# Patient Record
Sex: FEMALE | Race: WHITE | Employment: OTHER | URBAN - METROPOLITAN AREA
[De-identification: names, ages, dates, MRNs, and addresses within clinical notes are randomized per-mention and may not be internally consistent; named-entity substitution may affect disease eponyms.]

---

## 2017-04-07 ENCOUNTER — OFFICE VISIT (OUTPATIENT)
Dept: SURGERY | Age: 72
End: 2017-04-07

## 2017-04-07 VITALS
SYSTOLIC BLOOD PRESSURE: 121 MMHG | WEIGHT: 161 LBS | HEIGHT: 65 IN | HEART RATE: 77 BPM | DIASTOLIC BLOOD PRESSURE: 75 MMHG | BODY MASS INDEX: 26.82 KG/M2

## 2017-04-07 DIAGNOSIS — R92.8 ABNORMAL MAMMOGRAM: Primary | ICD-10-CM

## 2017-04-07 DIAGNOSIS — N63.0 LUMP OR MASS IN BREAST: ICD-10-CM

## 2017-04-07 DIAGNOSIS — I10 ESSENTIAL HYPERTENSION, BENIGN: ICD-10-CM

## 2017-04-07 DIAGNOSIS — C50.012 MALIGNANT NEOPLASM OF NIPPLE OF LEFT BREAST IN FEMALE (HCC): ICD-10-CM

## 2017-04-07 DIAGNOSIS — Z86.39 HISTORY OF HIGH CHOLESTEROL: ICD-10-CM

## 2017-04-07 PROCEDURE — G8420 CALC BMI NORM PARAMETERS: HCPCS | Performed by: SURGERY

## 2017-04-07 PROCEDURE — 3017F COLORECTAL CA SCREEN DOC REV: CPT | Performed by: SURGERY

## 2017-04-07 PROCEDURE — 99214 OFFICE O/P EST MOD 30 MIN: CPT | Performed by: SURGERY

## 2017-04-07 PROCEDURE — G8427 DOCREV CUR MEDS BY ELIG CLIN: HCPCS | Performed by: SURGERY

## 2017-04-07 PROCEDURE — 1036F TOBACCO NON-USER: CPT | Performed by: SURGERY

## 2017-04-07 PROCEDURE — 1123F ACP DISCUSS/DSCN MKR DOCD: CPT | Performed by: SURGERY

## 2017-04-07 PROCEDURE — 3014F SCREEN MAMMO DOC REV: CPT | Performed by: SURGERY

## 2017-04-07 PROCEDURE — 4040F PNEUMOC VAC/ADMIN/RCVD: CPT | Performed by: SURGERY

## 2017-04-07 PROCEDURE — G8399 PT W/DXA RESULTS DOCUMENT: HCPCS | Performed by: SURGERY

## 2017-04-07 PROCEDURE — 1090F PRES/ABSN URINE INCON ASSESS: CPT | Performed by: SURGERY

## 2017-04-07 ASSESSMENT — ENCOUNTER SYMPTOMS
ALLERGIC/IMMUNOLOGIC NEGATIVE: 1
GASTROINTESTINAL NEGATIVE: 1
RESPIRATORY NEGATIVE: 1

## 2018-04-10 ENCOUNTER — OFFICE VISIT (OUTPATIENT)
Dept: SURGERY | Age: 73
End: 2018-04-10

## 2018-04-10 VITALS
BODY MASS INDEX: 26.66 KG/M2 | HEIGHT: 65 IN | WEIGHT: 160 LBS | HEART RATE: 71 BPM | DIASTOLIC BLOOD PRESSURE: 77 MMHG | SYSTOLIC BLOOD PRESSURE: 129 MMHG

## 2018-04-10 DIAGNOSIS — R92.8 ABNORMAL MAMMOGRAM: ICD-10-CM

## 2018-04-10 DIAGNOSIS — N60.19 FIBROCYSTIC BREAST DISEASE (FCBD), UNSPECIFIED LATERALITY: ICD-10-CM

## 2018-04-10 DIAGNOSIS — N63.0 LUMP OR MASS IN BREAST: Primary | ICD-10-CM

## 2018-04-10 DIAGNOSIS — Z90.12 HX OF TOTAL MASTECTOMY OF LEFT BREAST: Primary | ICD-10-CM

## 2018-04-10 DIAGNOSIS — Z12.31 ENCOUNTER FOR SCREENING MAMMOGRAM FOR MALIGNANT NEOPLASM OF BREAST: ICD-10-CM

## 2018-04-10 DIAGNOSIS — C50.012 MALIGNANT NEOPLASM OF NIPPLE OF LEFT BREAST IN FEMALE, UNSPECIFIED ESTROGEN RECEPTOR STATUS (HCC): ICD-10-CM

## 2018-04-10 PROCEDURE — 1036F TOBACCO NON-USER: CPT | Performed by: SURGERY

## 2018-04-10 PROCEDURE — 1090F PRES/ABSN URINE INCON ASSESS: CPT | Performed by: SURGERY

## 2018-04-10 PROCEDURE — 4040F PNEUMOC VAC/ADMIN/RCVD: CPT | Performed by: SURGERY

## 2018-04-10 PROCEDURE — 99214 OFFICE O/P EST MOD 30 MIN: CPT | Performed by: SURGERY

## 2018-04-10 PROCEDURE — G8419 CALC BMI OUT NRM PARAM NOF/U: HCPCS | Performed by: SURGERY

## 2018-04-10 PROCEDURE — 3017F COLORECTAL CA SCREEN DOC REV: CPT | Performed by: SURGERY

## 2018-04-10 PROCEDURE — G8427 DOCREV CUR MEDS BY ELIG CLIN: HCPCS | Performed by: SURGERY

## 2018-04-10 PROCEDURE — 1123F ACP DISCUSS/DSCN MKR DOCD: CPT | Performed by: SURGERY

## 2018-04-10 PROCEDURE — G8399 PT W/DXA RESULTS DOCUMENT: HCPCS | Performed by: SURGERY

## 2018-04-10 PROCEDURE — 3014F SCREEN MAMMO DOC REV: CPT | Performed by: SURGERY

## 2018-04-10 ASSESSMENT — ENCOUNTER SYMPTOMS
GASTROINTESTINAL NEGATIVE: 1
RESPIRATORY NEGATIVE: 1
ALLERGIC/IMMUNOLOGIC NEGATIVE: 1

## 2018-09-21 ENCOUNTER — HOSPITAL ENCOUNTER (OUTPATIENT)
Dept: WOMENS IMAGING | Age: 73
Discharge: HOME | End: 2018-09-22
Attending: OBSTETRICS & GYNECOLOGY | Admitting: OBSTETRICS & GYNECOLOGY

## 2018-09-21 DIAGNOSIS — Z78.0 ASYMPTOMATIC MENOPAUSAL STATE: ICD-10-CM

## 2018-09-21 DIAGNOSIS — Z13.820 OSTEOPOROSIS SCREENING: ICD-10-CM

## 2018-11-16 DIAGNOSIS — M81.0 SENILE OSTEOPOROSIS: ICD-10-CM

## 2018-11-16 RX ORDER — EPINEPHRINE 1 MG/ML
0.3 INJECTION, SOLUTION, CONCENTRATE INTRAVENOUS PRN
Status: CANCELLED | OUTPATIENT
Start: 2018-11-16

## 2018-11-16 RX ORDER — METHYLPREDNISOLONE SODIUM SUCCINATE 125 MG/2ML
125 INJECTION, POWDER, LYOPHILIZED, FOR SOLUTION INTRAMUSCULAR; INTRAVENOUS ONCE
Status: CANCELLED | OUTPATIENT
Start: 2018-11-16 | End: 2018-11-16

## 2018-11-16 RX ORDER — 0.9 % SODIUM CHLORIDE 0.9 %
10 VIAL (ML) INJECTION ONCE
Status: CANCELLED | OUTPATIENT
Start: 2018-11-16 | End: 2018-11-16

## 2018-11-16 RX ORDER — SODIUM CHLORIDE 9 MG/ML
100 INJECTION, SOLUTION INTRAVENOUS CONTINUOUS
Status: CANCELLED | OUTPATIENT
Start: 2018-11-16

## 2018-11-16 RX ORDER — DIPHENHYDRAMINE HYDROCHLORIDE 50 MG/ML
50 INJECTION INTRAMUSCULAR; INTRAVENOUS ONCE
Status: CANCELLED | OUTPATIENT
Start: 2018-11-16 | End: 2018-11-16

## 2018-12-03 ENCOUNTER — HOSPITAL ENCOUNTER (OUTPATIENT)
Dept: INFUSION THERAPY | Age: 73
Setting detail: INFUSION SERIES
Discharge: HOME | End: 2018-12-03
Payer: MEDICARE

## 2018-12-03 VITALS
RESPIRATION RATE: 18 BRPM | HEART RATE: 78 BPM | DIASTOLIC BLOOD PRESSURE: 82 MMHG | OXYGEN SATURATION: 96 % | SYSTOLIC BLOOD PRESSURE: 152 MMHG | TEMPERATURE: 98 F

## 2018-12-03 DIAGNOSIS — M81.0 SENILE OSTEOPOROSIS: ICD-10-CM

## 2018-12-03 PROCEDURE — 96372 THER/PROPH/DIAG INJ SC/IM: CPT

## 2018-12-03 PROCEDURE — 6360000002 HC RX W HCPCS: Performed by: OBSTETRICS & GYNECOLOGY

## 2018-12-03 RX ORDER — EPINEPHRINE 1 MG/ML
0.3 INJECTION, SOLUTION, CONCENTRATE INTRAVENOUS PRN
Status: CANCELLED | OUTPATIENT
Start: 2018-12-03

## 2018-12-03 RX ORDER — DIPHENHYDRAMINE HYDROCHLORIDE 50 MG/ML
50 INJECTION INTRAMUSCULAR; INTRAVENOUS ONCE
Status: CANCELLED | OUTPATIENT
Start: 2018-12-03 | End: 2018-12-03

## 2018-12-03 RX ORDER — METHYLPREDNISOLONE SODIUM SUCCINATE 125 MG/2ML
125 INJECTION, POWDER, LYOPHILIZED, FOR SOLUTION INTRAMUSCULAR; INTRAVENOUS ONCE
Status: CANCELLED | OUTPATIENT
Start: 2018-12-03 | End: 2018-12-03

## 2018-12-03 RX ORDER — SODIUM CHLORIDE 9 MG/ML
100 INJECTION, SOLUTION INTRAVENOUS CONTINUOUS
Status: CANCELLED | OUTPATIENT
Start: 2018-12-03

## 2018-12-03 RX ORDER — 0.9 % SODIUM CHLORIDE 0.9 %
10 VIAL (ML) INJECTION ONCE
Status: CANCELLED | OUTPATIENT
Start: 2018-12-03 | End: 2018-12-03

## 2018-12-03 RX ADMIN — DENOSUMAB 60 MG: 60 INJECTION SUBCUTANEOUS at 12:25

## 2018-12-03 NOTE — PROGRESS NOTES
Outpatient [id number] [facility]     Prolia Visit    NAME:  Eleazar Hernandez  YOB: 1945  MEDICAL RECORD NUMBER:  [id number]  Episode Date:  12/3/2018    Patient arrived to [facility]   [] per wheelchair   [x] ambulatory   Color pale pink, no ankle edema . Pt here for first Prolia injection, is sl, nervous and very talkative. Pt was on oral Fosamax , but has had no dose in approx 3 years, Dr. Christina Chapa had he on a drug holiday. Pt has numerous questions re Prolia, all of pt's questions answered. Is this the patient's first Prolia Injection? Yes      Any recent oral or dental surgery? No. Pt will inform her dentist that she is taking Prolia. Any recent active fever, infections and/or illnesses? No    Patient has history of pathological fracture? No    Patient has had a recent fracture due to trauma or injury? No    Patient has had a recent orthopedic surgery or procedure done? No    Approximate date of last Dexa scan? 9/21/18       Current Lab Data:    Calcium:    On 11/12/18--- Calcium 9.8 done at lab core     Patient Currently taking Calcium Supplements? No , pt takes a MVI daily that has Calcium in it , pt also eats food high in Calcium daily    Prolia administered: Yes    Prolia dosage:  60 mg was administered slowly subcutaneously into the right upper arm. DSD to site     Response to treatment:  Well tolerated by patient. Due for next dose of Prolia on or after  June 4, 2019. Pt will call for appnt.       Electronically signed by Anil Dudley RN on 12/3/2018 at 1400pm

## 2019-04-26 ENCOUNTER — OFFICE VISIT (OUTPATIENT)
Dept: SURGERY | Age: 74
End: 2019-04-26
Payer: MEDICARE

## 2019-04-26 VITALS
WEIGHT: 160 LBS | SYSTOLIC BLOOD PRESSURE: 124 MMHG | DIASTOLIC BLOOD PRESSURE: 70 MMHG | HEIGHT: 65 IN | HEART RATE: 77 BPM | BODY MASS INDEX: 26.66 KG/M2

## 2019-04-26 DIAGNOSIS — N63.0 LUMP OR MASS IN BREAST: ICD-10-CM

## 2019-04-26 DIAGNOSIS — Z85.3 ENCOUNTER FOR FOLLOW-UP SURVEILLANCE OF BREAST CANCER: Primary | ICD-10-CM

## 2019-04-26 DIAGNOSIS — N60.19 FIBROCYSTIC BREAST DISEASE (FCBD), UNSPECIFIED LATERALITY: ICD-10-CM

## 2019-04-26 DIAGNOSIS — Z08 ENCOUNTER FOR FOLLOW-UP SURVEILLANCE OF BREAST CANCER: Primary | ICD-10-CM

## 2019-04-26 PROCEDURE — 3014F SCREEN MAMMO DOC REV: CPT | Performed by: SURGERY

## 2019-04-26 PROCEDURE — 4040F PNEUMOC VAC/ADMIN/RCVD: CPT | Performed by: SURGERY

## 2019-04-26 PROCEDURE — 1090F PRES/ABSN URINE INCON ASSESS: CPT | Performed by: SURGERY

## 2019-04-26 PROCEDURE — G8427 DOCREV CUR MEDS BY ELIG CLIN: HCPCS | Performed by: SURGERY

## 2019-04-26 PROCEDURE — 1036F TOBACCO NON-USER: CPT | Performed by: SURGERY

## 2019-04-26 PROCEDURE — G8399 PT W/DXA RESULTS DOCUMENT: HCPCS | Performed by: SURGERY

## 2019-04-26 PROCEDURE — G8419 CALC BMI OUT NRM PARAM NOF/U: HCPCS | Performed by: SURGERY

## 2019-04-26 PROCEDURE — 99214 OFFICE O/P EST MOD 30 MIN: CPT | Performed by: SURGERY

## 2019-04-26 PROCEDURE — 1123F ACP DISCUSS/DSCN MKR DOCD: CPT | Performed by: SURGERY

## 2019-04-26 PROCEDURE — 3017F COLORECTAL CA SCREEN DOC REV: CPT | Performed by: SURGERY

## 2019-04-26 NOTE — PATIENT INSTRUCTIONS
Mrs. Tamiko Bailey is doing well today, with no complains of pain, no significant changes occurring within the last year.      No findings of lumps of the bilateral breast during today's examination.         Results of recent Mammogram reveal the following:  No mammographic evidence of malignancy   No suspicious masses, suspicious  microcalcifications or areas of nonsurgical architectural distortion identified.  Unchanged upper outer right mid breast.    Return to the office in 1 year for Breast examination follow up.

## 2019-04-26 NOTE — PROGRESS NOTES
Daily Progress Note   Michaela Thakur MD      4/26/2019    Chief Complaint   Patient presents with    Other     Patient presents for a 1 year breast examination. HISTORY OF PRESENT ILLNESS:                The patient is a 68 y.o. female who presents with a 1 year Breast follow up.  2001 she has undergone a lEFT Breast mastectomy in 2001. Denies new lumps or masses in the breast.     She reports not health changes, has been prescribed Prolia for her bones, this has been prescribed by Dr. Chong Stubbs. Hx of incidental appendectomy       Vitals:    04/26/19 0920   BP: 124/70   Pulse: 77   Weight: 160 lb (72.6 kg)   Height: 5' 5\" (1.651 m)       Admission on 03/14/2016, Discharged on 03/14/2016   Component Date Value Ref Range Status    Ventricular Rate 03/13/2016 79  BPM Final    Atrial Rate 03/13/2016 79  BPM Final    P-R Interval 03/13/2016 150  ms Final    QRS Duration 03/13/2016 90  ms Final    Q-T Interval 03/13/2016 384  ms Final    QTc Calculation (Bazett) 03/13/2016 440  ms Final    P Axis 03/13/2016 29  degrees Final    R Axis 03/13/2016 74  degrees Final    T Axis 03/13/2016 8  degrees Final    Diagnosis 03/13/2016 Normal sinus rhythmNormal ECGWhen compared with ECG of 18-MAR-2012 22:15,No significant change was foundConfirmed by ANNABELLE CHRISTENSEN [facility] MD, Fawn Jara ([phone]) on 3/14/2016 8:19:26 AM   Final    Sodium 03/13/2016 139  136 - 145 mmol/L Final    Potassium 03/13/2016 3.5  3.5 - 5.1 mmol/L Final    Chloride 03/13/2016 98* 99 - 110 mmol/L Final    CO2 03/13/2016 27  21 - 32 mmol/L Final    Anion Gap 03/13/2016 14  3 - 16 Final    Glucose 03/13/2016 113* 70 - 99 mg/dL Final    BUN 03/13/2016 20  7 - 20 mg/dL Final    CREATININE 03/13/2016 0.6  0.6 - 1.2 mg/dL Final    GFR Non- 03/13/2016 >60  >60 Final    Comment: >60 mL/min/1.73m2 EGFR, calc. for ages 25 and older using the  MDRD formula (not corrected for weight), is valid for stable  renal function.       GFR  03/13/2016 >60  >60 Final    Comment: Chronic Kidney Disease: less than 60 ml/min/1.73 sq.m. Kidney Failure: less than 15 ml/min/1.73 sq.m. Results valid for patients 18 years and older.       Calcium 03/13/2016 10.0  8.3 - 10.6 mg/dL Final    Total Protein 03/13/2016 7.2  6.4 - 8.2 g/dL Final    Alb 03/13/2016 4.5  3.4 - 5.0 g/dL Final    Albumin/Globulin Ratio 03/13/2016 1.7  1.1 - 2.2 Final    Total Bilirubin 03/13/2016 0.4  0.0 - 1.0 mg/dL Final    Alkaline Phosphatase 03/13/2016 86  40 - 129 U/L Final    ALT 03/13/2016 27  10 - 40 U/L Final    AST 03/13/2016 24  15 - 37 U/L Final    Globulin 03/13/2016 2.7  g/dL Final    WBC 03/13/2016 6.6  4.0 - 11.0 K/uL Final    RBC 03/13/2016 4.15  4.00 - 5.20 M/uL Final    Hemoglobin 03/13/2016 12.4  12.0 - 16.0 g/dL Final    Hematocrit 03/13/2016 37.2  36.0 - 48.0 % Final    MCV 03/13/2016 89.6  80.0 - 100.0 fL Final    MCH 03/13/2016 29.9  26.0 - 34.0 pg Final    MCHC 03/13/2016 33.4  31.0 - 36.0 g/dL Final    RDW 03/13/2016 13.1  12.4 - 15.4 % Final    Platelets 45/88/4190 238  135 - 450 K/uL Final    MPV 03/13/2016 8.9  5.0 - 10.5 fL Final    Neutrophils % 03/13/2016 58.3  % Final    Lymphocytes % 03/13/2016 29.6  % Final    Monocytes % 03/13/2016 9.2  % Final    Eosinophils % 03/13/2016 2.0  % Final    Basophils % 03/13/2016 0.9  % Final    Neutrophils # 03/13/2016 3.8  1.7 - 7.7 K/uL Final    Lymphocytes # 03/13/2016 1.9  1.0 - 5.1 K/uL Final    Monocytes # 03/13/2016 0.6  0.0 - 1.3 K/uL Final    Eosinophils # 03/13/2016 0.1  0.0 - 0.6 K/uL Final    Basophils # 03/13/2016 0.1  0.0 - 0.2 K/uL Final    Troponin 03/13/2016 <0.01  <0.01 ng/mL Final    Methodology by Troponin T    Lipase 03/13/2016 35.0  13.0 - 60.0 U/L Final    Amphetamine Screen, Urine 03/13/2016 Neg  Negative <1000ng/mL Final    Barbiturate Screen, Ur 03/13/2016 Neg  Negative <200 ng/mL Final    Benzodiazepine Screen, Urine 03/13/2016 Neg  Negative <200 ng/mL Final    Cannabinoid Scrn, Ur 03/13/2016 Neg  Negative <50 ng/mL Final    Cocaine Metabolite Screen, Urine 03/13/2016 Neg  Negative <300 ng/mL Final    Opiate Scrn, Ur 03/13/2016 Neg  Negative <300 ng/mL Final    Comment: \"Therapeutic levels of pain medication, especially oxycontin and synthetic  opioids, may not be detected by this Methodology. Pain management screen  panel  Drug panel-PM-Hi Res Ur, Interp (PAIN) should be considered for drug  monitoring \".  PCP Screen, Urine 03/13/2016 Neg  Negative <25 ng/mL Final    Methadone Screen, Urine 03/13/2016 Neg  Negative <300 ng/mL Final    Propoxyphene Scrn, Ur 03/13/2016 Neg  Negative <300 ng/mL Final    pH, UA 03/13/2016 6.0   Final    Comment: Urine pH less than 5.0 or greater than 8.0 may indicate sample adulteration. Another sample should be collected if clinically  indicated.  Drug Screen Comment: 03/13/2016 see below   Final    Comment: This method is a screening test to detect only these drug  classes as part of a medical workup. Confirmatory testing  by another method should be ordered if clinically indicated.  Troponin 03/14/2016 <0.01  <0.01 ng/mL Final    Methodology by Troponin T    Troponin 03/14/2016 <0.01  <0.01 ng/mL Final    Methodology by Troponin T    Cholesterol, Total 03/14/2016 152  0 - 199 mg/dL Final    Triglycerides 03/14/2016 82  0 - 150 mg/dL Final    HDL 03/14/2016 53  40 - 60 mg/dL Final    LDL Calculated 03/14/2016 83  <100 mg/dL Final    VLDL Cholesterol Calculated 03/14/2016 16  Not Established mg/dL Final       Review of Systems   Constitutional: Negative. HENT: Negative. Eyes: Positive for visual disturbance ( Wears glasses). Respiratory: Negative. Cardiovascular: Negative. Gastrointestinal: Negative. Endocrine: Negative. Genitourinary: Negative. Musculoskeletal: Negative. Skin: Negative. Allergic/Immunologic: Negative. Neurological: Negative. Hematological: Negative. Psychiatric/Behavioral: Negative. Physical Exam   Constitutional: She is oriented to person, place, and time. Vital signs are normal. She appears well-developed and well-nourished. Non-toxic appearance. She does not have a sickly appearance. She does not appear ill. No distress. HENT:   Head: Normocephalic and atraumatic. Right Ear: External ear normal.   Left Ear: External ear normal.   Eyes: Pupils are equal, round, and reactive to light. No scleral icterus. Neck: Normal range of motion. Neck supple. Normal carotid pulses and no JVD present. Carotid bruit is not present. No tracheal deviation, no edema and no erythema present. No thyromegaly present. Cardiovascular: Normal rate, regular rhythm, S1 normal, normal heart sounds and intact distal pulses. No murmur heard. Pulses:       Carotid pulses are 2+ on the right side, and 2+ on the left side. Femoral pulses are 2+ on the right side, and 2+ on the left side. Dorsalis pedis pulses are 2+ on the right side, and 2+ on the left side. Posterior tibial pulses are 2+ on the right side, and 1+ on the left side. Pulmonary/Chest: Effort normal and breath sounds normal. No accessory muscle usage or stridor. No tachypnea. No respiratory distress. She has no wheezes. She has no rales. She exhibits no edema and no swelling. Right breast exhibits no mass, no nipple discharge and no tenderness. Abdominal: Soft. Normal appearance and bowel sounds are normal. She exhibits no distension, no abdominal bruit and no mass. There is no hepatosplenomegaly, splenomegaly or hepatomegaly. There is no tenderness. There is no rebound, no guarding and no CVA tenderness. No hernia. Hernia confirmed negative in the ventral area, confirmed negative in the right inguinal area and confirmed negative in the left inguinal area. Genitourinary:   Genitourinary Comments: Rectal exam/stool guaiac not indicated. Musculoskeletal: Normal range of motion. She exhibits no edema or tenderness. Arms:  Lymphadenopathy:        Head (right side): No submandibular, no preauricular, no posterior auricular and no occipital adenopathy present. Head (left side): No submandibular, no preauricular, no posterior auricular and no occipital adenopathy present. She has no cervical adenopathy. Right cervical: No superficial cervical, no deep cervical and no posterior cervical adenopathy present. Left cervical: No superficial cervical, no deep cervical and no posterior cervical adenopathy present. Right axillary: No pectoral and no lateral adenopathy present. Left axillary: No pectoral and no lateral adenopathy present. Right: No inguinal and no supraclavicular adenopathy present. Left: No inguinal and no supraclavicular adenopathy present. Neurological: She is alert and oriented to person, place, and time. She displays no atrophy. No cranial nerve deficit or sensory deficit. She exhibits normal muscle tone. Coordination and gait normal.   Skin: Skin is warm, dry and intact. No bruising, no laceration, no lesion and no rash noted. No cyanosis or erythema. No pallor. Psychiatric: She has a normal mood and affect. Her speech is normal and behavior is normal. Judgment and thought content normal. Cognition and memory are normal.   Nursing note and vitals reviewed. ASSESSMENT:    Problem List Items Addressed This Visit     Lump or mass in breast    Fibrocystic disease of breast      Other Visit Diagnoses     Encounter for follow-up surveillance of breast cancer    -  Primary          PLAN:  Mrs. Christian Villar is doing well today, with no complains of pain, no significant changes occurring within the last year. No findings of lumps of the bilateral breast during today's examination.        Results of recent Mammogram reveal the following:  Status post left mastectomy, 2001  There is no suspicious mass, microcalcifications, or architectural distortion. No significant new findings. Return to the office in 1 year for Breast examination follow up. No orders of the defined types were placed in this encounter. Return in about 1 year (around 4/26/2020), or bilateral breast examination. I Yang Yang MA am scribing for and in the presence of Princess Kervin MD on this date of 04/26/19     I Aurelia García MD personally performed the services described in this documentation as scribed by the Medical Assistant Yang Yang  in my presence and it is both accurate and complete. Electronically signed by Princess Kervin MD on 4/26/2019 at 3:05 PMat the time of HYSTERECTOMY at the age of 38, no cancer found.      Past Medical History:   Diagnosis Date    Cancer [facility])     left breast cancer     Essential hypertension, benign     Fibrocystic disease of breast     Lump or mass in breast     Other and unspecified hyperlipidemia     Ovarian cyst     Peptic ulcer, unspecified site, unspecified as acute or chronic, without mention of hemorrhage, perforation, or obstruction     Personal history of malignant neoplasm of breast     Senile osteoporosis 11/16/2018    Varicose veins of lower extremities        Past Surgical History:   Procedure Laterality Date    APPENDECTOMY      BREAST BIOPSY Left     hx of mastectomy     HYSTERECTOMY      MASTECTOMY  2001    left    TONSILLECTOMY         Social History     Socioeconomic History    Marital status:      Spouse name: Not on file    Number of children: 1    Years of education: Not on file    Highest education level: Not on file   Occupational History    Not on file   Social Needs    Financial resource strain: Not on file    Food insecurity:     Worry: Not on file     Inability: Not on file    Transportation needs:     Medical: Not on file     Non-medical: Not on file   Tobacco Use    Smoking status: Never Smoker    Smokeless tobacco: Never Used   Substance and Sexual Activity    Alcohol use: No    Drug use: No    Sexual activity: Not on file   Lifestyle    Physical activity:     Days per week: Not on file     Minutes per session: Not on file    Stress: Not on file   Relationships    Social connections:     Talks on phone: Not on file     Gets together: Not on file     Attends [employer] service: Not on file     Active member of club or organization: Not on file     Attends meetings of clubs or organizations: Not on file     Relationship status: Not on file    Intimate partner violence:     Fear of current or ex partner: Not on file     Emotionally abused: Not on file     Physically abused: Not on file     Forced sexual activity: Not on file   Other Topics Concern    Not on file   Social History Narrative    Not on file       Family History   Problem Relation Age of Onset    Cancer Father             Hypertension Mother         in stable health    Coronary Art Dis Mother         in stable health    Diabetes Mother         in stable health    High Cholesterol Mother         in stable health    Other Mother         varicose veins, in stable health         Current Outpatient Medications:     denosumab (PROLIA) 60 MG/ML SOLN SC injection, Inject 60 mg into the skin once, Disp: , Rfl:     Misc Natural Products (GLUCOSAMINE CHOND COMPLEX/MSM PO), Take by mouth, Disp: , Rfl:     diphenhydrAMINE (BENADRYL) 25 MG tablet, Take 25 mg by mouth every 6 hours as needed for Allergies (for allergies), Disp: , Rfl:     Wheat Dextrin (BENEFIBER) POWD, Take 4 g by mouth daily, Disp: , Rfl:     pantoprazole (PROTONIX) 40 MG tablet, , Disp: , Rfl: 5    docusate sodium (COLACE) 100 MG capsule, Take 100 mg by mouth 2 times daily. , Disp: , Rfl:     LIPITOR 40 MG tablet, 20 mg , Disp: , Rfl:     losartan-hydrochlorothiazide (HYZAAR) 100-25 MG per tablet, , Disp: , Rfl:     alprazolam (XANAX) 0.25 MG tablet, , Disp: , Rfl:     Multiple Vitamins-Minerals (CENTRUM SILVER ULTRA WOMENS PO), Take  by mouth.  , Disp: , Rfl:     Adhesive tape    Return in about 1 year (around 4/26/2020), or bilateral breast examination.

## 2019-06-07 RX ORDER — DIPHENHYDRAMINE HYDROCHLORIDE 50 MG/ML
50 INJECTION INTRAMUSCULAR; INTRAVENOUS ONCE
Status: CANCELLED | OUTPATIENT
Start: 2019-06-07

## 2019-06-07 RX ORDER — METHYLPREDNISOLONE SODIUM SUCCINATE 125 MG/2ML
125 INJECTION, POWDER, LYOPHILIZED, FOR SOLUTION INTRAMUSCULAR; INTRAVENOUS ONCE
Status: CANCELLED | OUTPATIENT
Start: 2019-06-07

## 2019-06-07 RX ORDER — EPINEPHRINE 1 MG/ML
0.3 INJECTION, SOLUTION, CONCENTRATE INTRAVENOUS PRN
Status: CANCELLED | OUTPATIENT
Start: 2019-06-07

## 2019-06-07 RX ORDER — 0.9 % SODIUM CHLORIDE 0.9 %
10 VIAL (ML) INJECTION ONCE
Status: CANCELLED | OUTPATIENT
Start: 2019-06-07

## 2019-06-07 RX ORDER — SODIUM CHLORIDE 9 MG/ML
INJECTION, SOLUTION INTRAVENOUS CONTINUOUS
Status: CANCELLED | OUTPATIENT
Start: 2019-06-07

## 2019-06-13 ENCOUNTER — HOSPITAL ENCOUNTER (OUTPATIENT)
Dept: INFUSION THERAPY | Age: 74
Setting detail: INFUSION SERIES
Discharge: HOME | End: 2019-06-13
Payer: MEDICARE

## 2019-06-13 VITALS
HEART RATE: 70 BPM | DIASTOLIC BLOOD PRESSURE: 72 MMHG | TEMPERATURE: 98.1 F | RESPIRATION RATE: 17 BRPM | OXYGEN SATURATION: 96 % | SYSTOLIC BLOOD PRESSURE: 114 MMHG

## 2019-06-13 DIAGNOSIS — M81.0 SENILE OSTEOPOROSIS: Primary | ICD-10-CM

## 2019-06-13 PROCEDURE — 6360000002 HC RX W HCPCS: Performed by: OBSTETRICS & GYNECOLOGY

## 2019-06-13 PROCEDURE — 96372 THER/PROPH/DIAG INJ SC/IM: CPT

## 2019-06-13 RX ORDER — DIPHENHYDRAMINE HYDROCHLORIDE 50 MG/ML
50 INJECTION INTRAMUSCULAR; INTRAVENOUS ONCE
Status: CANCELLED | OUTPATIENT
Start: 2019-12-12

## 2019-06-13 RX ORDER — EPINEPHRINE 1 MG/ML
0.3 INJECTION, SOLUTION, CONCENTRATE INTRAVENOUS PRN
Status: CANCELLED | OUTPATIENT
Start: 2019-12-12

## 2019-06-13 RX ORDER — SODIUM CHLORIDE 9 MG/ML
INJECTION, SOLUTION INTRAVENOUS CONTINUOUS
Status: CANCELLED | OUTPATIENT
Start: 2019-12-12

## 2019-06-13 RX ORDER — 0.9 % SODIUM CHLORIDE 0.9 %
10 VIAL (ML) INJECTION ONCE
Status: CANCELLED | OUTPATIENT
Start: 2019-12-12

## 2019-06-13 RX ORDER — METHYLPREDNISOLONE SODIUM SUCCINATE 125 MG/2ML
125 INJECTION, POWDER, LYOPHILIZED, FOR SOLUTION INTRAMUSCULAR; INTRAVENOUS ONCE
Status: CANCELLED | OUTPATIENT
Start: 2019-12-12

## 2019-06-13 RX ADMIN — DENOSUMAB 60 MG: 60 INJECTION SUBCUTANEOUS at 09:30

## 2019-06-13 NOTE — PROGRESS NOTES
Outpatient [id number] [facility]     Prolia Visit    NAME:  Jessie Hernandez  YOB: 1945  MEDICAL RECORD NUMBER:  [id number]  Episode Date:  6/13/2019    Patient arrived to [facility] 58   [] per wheelchair   [x] ambulatory   Pt in good spirits, cheerful, has no c/o's. Denies bone pain . No ankle pain. Is this the patient's first Prolia Injection? No , this is second dose . Date of last Prolia Injection ? December 3, 2018. Did the patient experience any adverse reactions to Prolia Injection? Yes, had mild aching , no fever, pt took Tylenol with relief     Any recent oral or dental surgery? No    Any recent active fever, infections and/or illnesses? No    Patient has history of pathological fracture? No    Patient has had a recent fracture due to trauma or injury? No    Patient has had a recent orthopedic surgery or procedure done? No    Approximate date of last Dexa scan?  9/2/18       Current Lab Data:    Calcium:  10    On 5/21/19 at [facility] --results scanned in Epic       Patient Currently taking Calcium Supplements? No , pt does take a  MVI daily and eats a diet high in Calcium    Prolia administered: Yes    Prolia dosage: 60 mg was administered slowly subcutaneously into the right upper arm. DSD to site     Response to treatment:  Well tolerated by patient. Due for next dose of Prolia on or after  December 14, 2019. Pt aware to call for appnt and that a Calcium level needs to be drawn prior to visit, pt will have lab work done in her PCP's office in Dec. 2019.       Electronically signed by Gray Doe RN on 6/13/2019 at 1000am

## 2019-12-13 RX ORDER — EPINEPHRINE 1 MG/ML
0.3 INJECTION, SOLUTION, CONCENTRATE INTRAVENOUS PRN
Status: CANCELLED | OUTPATIENT
Start: 2019-12-13

## 2019-12-13 RX ORDER — METHYLPREDNISOLONE SODIUM SUCCINATE 125 MG/2ML
125 INJECTION, POWDER, LYOPHILIZED, FOR SOLUTION INTRAMUSCULAR; INTRAVENOUS ONCE
Status: CANCELLED | OUTPATIENT
Start: 2019-12-13

## 2019-12-13 RX ORDER — DIPHENHYDRAMINE HYDROCHLORIDE 50 MG/ML
50 INJECTION INTRAMUSCULAR; INTRAVENOUS ONCE
Status: CANCELLED | OUTPATIENT
Start: 2019-12-13

## 2019-12-13 RX ORDER — SODIUM CHLORIDE 9 MG/ML
INJECTION, SOLUTION INTRAVENOUS CONTINUOUS
Status: CANCELLED | OUTPATIENT
Start: 2019-12-13

## 2019-12-30 ENCOUNTER — HOSPITAL ENCOUNTER (OUTPATIENT)
Dept: INFUSION THERAPY | Age: 74
Setting detail: INFUSION SERIES
Discharge: HOME | End: 2019-12-30
Payer: MEDICARE

## 2019-12-30 VITALS
DIASTOLIC BLOOD PRESSURE: 75 MMHG | RESPIRATION RATE: 16 BRPM | OXYGEN SATURATION: 98 % | SYSTOLIC BLOOD PRESSURE: 121 MMHG | HEART RATE: 73 BPM

## 2019-12-30 DIAGNOSIS — M81.0 SENILE OSTEOPOROSIS: Primary | ICD-10-CM

## 2019-12-30 PROCEDURE — 6360000002 HC RX W HCPCS: Performed by: OBSTETRICS & GYNECOLOGY

## 2019-12-30 PROCEDURE — 96372 THER/PROPH/DIAG INJ SC/IM: CPT

## 2019-12-30 RX ORDER — SODIUM CHLORIDE 9 MG/ML
INJECTION, SOLUTION INTRAVENOUS CONTINUOUS
Status: CANCELLED | OUTPATIENT
Start: 2020-06-29

## 2019-12-30 RX ORDER — DIPHENHYDRAMINE HYDROCHLORIDE 50 MG/ML
50 INJECTION INTRAMUSCULAR; INTRAVENOUS ONCE
Status: CANCELLED | OUTPATIENT
Start: 2020-06-29

## 2019-12-30 RX ORDER — EPINEPHRINE 1 MG/ML
0.3 INJECTION, SOLUTION, CONCENTRATE INTRAVENOUS PRN
Status: CANCELLED | OUTPATIENT
Start: 2020-06-29

## 2019-12-30 RX ORDER — METHYLPREDNISOLONE SODIUM SUCCINATE 125 MG/2ML
125 INJECTION, POWDER, LYOPHILIZED, FOR SOLUTION INTRAMUSCULAR; INTRAVENOUS ONCE
Status: CANCELLED | OUTPATIENT
Start: 2020-06-29

## 2019-12-30 RX ADMIN — DENOSUMAB 60 MG: 60 INJECTION SUBCUTANEOUS at 10:37

## 2019-12-30 NOTE — PROGRESS NOTES
Outpatient [place] [address]     Prolia Visit    NAME:  Christiane Hernandez  YOB: 1945  MEDICAL RECORD NUMBER:  [id number]  Episode Date:  12/30/2019    Patient arrived to [facility] 58   [] per wheelchair   [x] ambulatory     Is this the patient's first Prolia Injection? No     Date of last Prolia Injection ? June 13, 2019. Did the patient experience any adverse reactions to Prolia Injection? No    Any recent oral or dental surgery? No    Any recent active fever, infections and/or illnesses? No    Patient has history of pathological fracture? No    Patient has had a recent fracture due to trauma or injury? No    Patient has had a recent orthopedic surgery or procedure done? No    Approximate date of last Dexa scan? 9/21/18       /75   Pulse 73   Resp 16   SpO2 98%     Current Lab Data:    Calcium:    Lab Results   Component Value Date    CALCIUM 10.0 03/13/2016 12/2/19 Ca+= 10.1    Patient Currently taking Calcium Supplements? Yes calcium daily     Prolia administered: Yes    Prolia dosage: 60 mg was administered slowly subcutaneously into the right upper arm. Response to treatment:  Well tolerated by patient. Due for next dose of Prolia on July 1, 2020.      Electronically signed by Margaret Jenkins RN on 12/30/2019 at 10:35 AM

## 2021-01-25 ENCOUNTER — TELEPHONE (OUTPATIENT)
Dept: SURGERY | Age: 76
End: 2021-01-25

## 2021-02-22 ENCOUNTER — HOSPITAL ENCOUNTER (OUTPATIENT)
Dept: ULTRASOUND IMAGING | Age: 76
Discharge: HOME | End: 2021-02-22
Payer: MEDICARE

## 2021-02-22 ENCOUNTER — OFFICE VISIT (OUTPATIENT)
Dept: BREAST CENTER | Age: 76
End: 2021-02-22
Payer: MEDICARE

## 2021-02-22 VITALS
SYSTOLIC BLOOD PRESSURE: 140 MMHG | DIASTOLIC BLOOD PRESSURE: 60 MMHG | TEMPERATURE: 97.2 F | HEIGHT: 65 IN | HEART RATE: 78 BPM | WEIGHT: 162.2 LBS | OXYGEN SATURATION: 98 % | BODY MASS INDEX: 27.02 KG/M2

## 2021-02-22 DIAGNOSIS — Z17.0 MALIGNANT NEOPLASM OF NIPPLE OF LEFT BREAST IN FEMALE, ESTROGEN RECEPTOR POSITIVE (HCC): ICD-10-CM

## 2021-02-22 DIAGNOSIS — Z90.12 S/P LEFT MASTECTOMY: ICD-10-CM

## 2021-02-22 DIAGNOSIS — N63.10 BREAST MASS, RIGHT: ICD-10-CM

## 2021-02-22 DIAGNOSIS — C50.012 MALIGNANT NEOPLASM OF NIPPLE OF LEFT BREAST IN FEMALE, ESTROGEN RECEPTOR POSITIVE (HCC): ICD-10-CM

## 2021-02-22 DIAGNOSIS — Z17.0 MALIGNANT NEOPLASM OF NIPPLE OF LEFT BREAST IN FEMALE, ESTROGEN RECEPTOR POSITIVE (HCC): Primary | ICD-10-CM

## 2021-02-22 DIAGNOSIS — C50.012 MALIGNANT NEOPLASM OF NIPPLE OF LEFT BREAST IN FEMALE, ESTROGEN RECEPTOR POSITIVE (HCC): Primary | ICD-10-CM

## 2021-02-22 PROCEDURE — 1036F TOBACCO NON-USER: CPT | Performed by: SURGERY

## 2021-02-22 PROCEDURE — G8399 PT W/DXA RESULTS DOCUMENT: HCPCS | Performed by: SURGERY

## 2021-02-22 PROCEDURE — 4040F PNEUMOC VAC/ADMIN/RCVD: CPT | Performed by: SURGERY

## 2021-02-22 PROCEDURE — 99213 OFFICE O/P EST LOW 20 MIN: CPT | Performed by: SURGERY

## 2021-02-22 PROCEDURE — G9899 SCRN MAM PERF RSLTS DOC: HCPCS | Performed by: SURGERY

## 2021-02-22 PROCEDURE — 3017F COLORECTAL CA SCREEN DOC REV: CPT | Performed by: SURGERY

## 2021-02-22 PROCEDURE — G8427 DOCREV CUR MEDS BY ELIG CLIN: HCPCS | Performed by: SURGERY

## 2021-02-22 PROCEDURE — 1123F ACP DISCUSS/DSCN MKR DOCD: CPT | Performed by: SURGERY

## 2021-02-22 PROCEDURE — G8417 CALC BMI ABV UP PARAM F/U: HCPCS | Performed by: SURGERY

## 2021-02-22 PROCEDURE — G8484 FLU IMMUNIZE NO ADMIN: HCPCS | Performed by: SURGERY

## 2021-02-22 PROCEDURE — 1090F PRES/ABSN URINE INCON ASSESS: CPT | Performed by: SURGERY

## 2021-02-22 PROCEDURE — 76642 ULTRASOUND BREAST LIMITED: CPT

## 2021-02-22 NOTE — PROGRESS NOTES
Subjective:      Patient ID: Ely Maddox is a 77 y.o. female. HPI   Chief Complaint   Patient presents with    Established New Doctor     Referred by Dr. Glen KING. Breast Pain 2 -3 Months     Patient is s/p left mastectomy (0/6 nodes) 2001 with Dr Glen Nieves. Postop Tamoxifen x 5 years. She has also had 3 benign right breast biopsies in the past. Recently developed upper central right breast pain and a possible mass. She has not felt any left chest wall masses, no left arm pain or swelling. Patient's mother had breast cancer age 80. Right mammogram 8/2020 [facility] BIRADS 1B.     Past Medical History:   Diagnosis Date    Cancer [facility])     left breast cancer s/p mastectomy    Essential hypertension, benign     Fibrocystic disease of breast     Fracture of finger of right hand     5th finger , fx after injury , numerous years ago     Lump or mass in breast     Other and unspecified hyperlipidemia     Ovarian cyst     Peptic ulcer, unspecified site, unspecified as acute or chronic, without mention of hemorrhage, perforation, or obstruction     Personal history of malignant neoplasm of breast     Senile osteoporosis 11/16/2018    Varicose veins of lower extremities        Past Surgical History:   Procedure Laterality Date    APPENDECTOMY      BREAST BIOPSY Left 2001    hx of mastectomy     HYSTERECTOMY      MASTECTOMY  2001    left    TONSILLECTOMY         Current Outpatient Medications   Medication Sig Dispense Refill    denosumab (PROLIA) 60 MG/ML SOLN SC injection Inject 60 mg into the skin once      Misc Natural Products (GLUCOSAMINE CHOND COMPLEX/MSM PO) Take by mouth      diphenhydrAMINE (BENADRYL) 25 MG tablet Take 25 mg by mouth every 6 hours as needed for Allergies (for allergies)      Wheat Dextrin (BENEFIBER) POWD Take 4 g by mouth daily      pantoprazole (PROTONIX) 40 MG tablet Take 40 mg by mouth daily   5    LIPITOR 40 MG tablet Take 20 mg by mouth nightly  losartan-hydrochlorothiazide (HYZAAR) 100-25 MG per tablet Take by mouth nightly       alprazolam (XANAX) 0.25 MG tablet Take 0.25 mg by mouth as needed.  Multiple Vitamins-Minerals (CENTRUM SILVER ULTRA WOMENS PO) Take by mouth daily        No current facility-administered medications for this visit.         Social History     Socioeconomic History    Marital status:      Spouse name: Not on file    Number of children: 1    Years of education: Not on file    Highest education level: Not on file   Occupational History    Not on file   Social Needs    Financial resource strain: Not on file    Food insecurity     Worry: Not on file     Inability: Not on file   [employer] needs     Medical: Not on file     Non-medical: Not on file   Tobacco Use    Smoking status: Never Smoker    Smokeless tobacco: Never Used   Substance and Sexual Activity    Alcohol use: No    Drug use: No    Sexual activity: Not on file   Lifestyle    Physical activity     Days per week: Not on file     Minutes per session: Not on file    Stress: Not on file   Relationships    Social connections     Talks on phone: Not on file     Gets together: Not on file     Attends Hindu service: Not on file     Active member of club or organization: Not on file     Attends meetings of clubs or organizations: Not on file     Relationship status: Not on file    Intimate partner violence     Fear of current or ex partner: Not on file     Emotionally abused: Not on file     Physically abused: Not on file     Forced sexual activity: Not on file   Other Topics Concern    Not on file   Social History Narrative    Not on file       ROS  Constitutional: no weight loss, fever, night sweats   Skin: negative  Cardiovascular: no chest pain or palpitations   Pulmonary: No cough, sputum, or hemoptysis   GI:No abdominal pain  Breast: see above  All other systems were reviewed and are negative    Objective:   Physical Exam Vitals signs reviewed. Exam conducted with a chaperone present. Constitutional:       General: She is not in acute distress. Appearance: Normal appearance. She is well-developed. She is not diaphoretic. HENT:      Head: Normocephalic and atraumatic. Nose: Nose normal.      Mouth/Throat:      Mouth: Mucous membranes are moist.      Pharynx: Oropharynx is clear. Neck:      Musculoskeletal: Normal range of motion. No muscular tenderness. Trachea: No tracheal deviation. Cardiovascular:      Rate and Rhythm: Normal rate. Pulmonary:      Effort: Pulmonary effort is normal. No respiratory distress. Breath sounds: No wheezing. Abdominal:      General: There is no distension. Palpations: Abdomen is soft. Musculoskeletal: Normal range of motion. General: No tenderness. Lymphadenopathy:      Cervical: No cervical adenopathy. Upper Body:      Right upper body: No axillary adenopathy. Left upper body: No axillary adenopathy. Skin:     General: Skin is warm and dry. Coloration: Skin is not pale. Findings: No erythema or rash. Neurological:      Mental Status: She is alert and oriented to person, place, and time. Cranial Nerves: No cranial nerve deficit. Coordination: Coordination normal.   Psychiatric:         Behavior: Behavior normal.         Thought Content: Thought content normal.         Judgment: Judgment normal.     right breast -area of thickened breast tissue 12:00, 2-4 cm from nipple, no discrete mass, no nipple or skin changes. Left mastectomy site - well healed scar, no masses, no skin changes. Assessment:       Diagnosis Orders   1. Malignant neoplasm of nipple of left breast in female, estrogen receptor positive ([address]  US BREAST LIMITED RIGHT   2. S/P left mastectomy     3. Breast mass, right             Plan: Will obtain u/s of right breast today to further evaluate the area of concern.

## 2021-02-22 NOTE — PATIENT INSTRUCTIONS
Mammogram results were discussed with patient today in office  Breast exam   Continue with monthly self breast exams  Return to office in Aug around 08.21.20 Right Screening Mammogram    Healthy Lifestyle Recommendations: healthy diet (decrease consumption of red meat, increase fresh fruits and vegetables), decreased alcohol consumption (less than 4 drinks/week), adequate sleep (goal 6-8 hours), routine exercise (goal 150 minutes/week or greater), weight control.

## 2021-06-17 ENCOUNTER — TELEPHONE (OUTPATIENT)
Dept: BREAST CENTER | Age: 76
End: 2021-06-17

## 2021-06-17 NOTE — TELEPHONE ENCOUNTER
Tried to call patient to let her know her 8/23 appointment has been moved to 8/30 she has no voicemail. I will try to contact her at a later time.

## 2021-08-30 ENCOUNTER — HOSPITAL ENCOUNTER (OUTPATIENT)
Dept: WOMENS IMAGING | Age: 76
Discharge: HOME | End: 2021-08-30
Payer: MEDICARE

## 2021-08-30 ENCOUNTER — OFFICE VISIT (OUTPATIENT)
Dept: BREAST CENTER | Age: 76
End: 2021-08-30
Payer: MEDICARE

## 2021-08-30 VITALS
HEART RATE: 95 BPM | DIASTOLIC BLOOD PRESSURE: 75 MMHG | BODY MASS INDEX: 26.33 KG/M2 | SYSTOLIC BLOOD PRESSURE: 150 MMHG | WEIGHT: 158 LBS | TEMPERATURE: 97 F | HEIGHT: 65 IN

## 2021-08-30 DIAGNOSIS — Z90.12 S/P LEFT MASTECTOMY: ICD-10-CM

## 2021-08-30 DIAGNOSIS — Z17.0 MALIGNANT NEOPLASM OF NIPPLE OF LEFT BREAST IN FEMALE, ESTROGEN RECEPTOR POSITIVE (HCC): ICD-10-CM

## 2021-08-30 DIAGNOSIS — C50.012 MALIGNANT NEOPLASM OF NIPPLE OF LEFT BREAST IN FEMALE, ESTROGEN RECEPTOR POSITIVE (HCC): ICD-10-CM

## 2021-08-30 DIAGNOSIS — Z17.0 MALIGNANT NEOPLASM OF NIPPLE OF LEFT BREAST IN FEMALE, ESTROGEN RECEPTOR POSITIVE (HCC): Primary | ICD-10-CM

## 2021-08-30 DIAGNOSIS — N63.10 BREAST MASS, RIGHT: ICD-10-CM

## 2021-08-30 DIAGNOSIS — C50.012 MALIGNANT NEOPLASM OF NIPPLE OF LEFT BREAST IN FEMALE, ESTROGEN RECEPTOR POSITIVE (HCC): Primary | ICD-10-CM

## 2021-08-30 DIAGNOSIS — Z12.31 VISIT FOR SCREENING MAMMOGRAM: ICD-10-CM

## 2021-08-30 PROCEDURE — G8399 PT W/DXA RESULTS DOCUMENT: HCPCS | Performed by: SURGERY

## 2021-08-30 PROCEDURE — 3017F COLORECTAL CA SCREEN DOC REV: CPT | Performed by: SURGERY

## 2021-08-30 PROCEDURE — 99213 OFFICE O/P EST LOW 20 MIN: CPT | Performed by: SURGERY

## 2021-08-30 PROCEDURE — 4040F PNEUMOC VAC/ADMIN/RCVD: CPT | Performed by: SURGERY

## 2021-08-30 PROCEDURE — 1090F PRES/ABSN URINE INCON ASSESS: CPT | Performed by: SURGERY

## 2021-08-30 PROCEDURE — G8427 DOCREV CUR MEDS BY ELIG CLIN: HCPCS | Performed by: SURGERY

## 2021-08-30 PROCEDURE — 1036F TOBACCO NON-USER: CPT | Performed by: SURGERY

## 2021-08-30 PROCEDURE — G8417 CALC BMI ABV UP PARAM F/U: HCPCS | Performed by: SURGERY

## 2021-08-30 PROCEDURE — 77063 BREAST TOMOSYNTHESIS BI: CPT

## 2021-08-30 PROCEDURE — 1123F ACP DISCUSS/DSCN MKR DOCD: CPT | Performed by: SURGERY

## 2021-08-30 NOTE — PATIENT INSTRUCTIONS
Mammogram results were discussed with patient today in office  Breast exam was unremarkable for any palpable masses  Continue with monthly self breast exams  Return to office in one year with right screening mammogram    Healthy Lifestyle Recommendations: healthy diet (decrease consumption of red meat, increase fresh fruits and vegetables), decreased alcohol consumption (less than 4 drinks/week), adequate sleep (goal 6-8 hours), routine exercise (goal 150 minutes/week or greater), weight control.

## 2021-08-30 NOTE — PROGRESS NOTES
Subjective:      Patient ID: Dinah Beckford is a 77 y.o. female. HPI   Chief Complaint   Patient presents with    1 Year Follow Up     1 year follow up     Patient is s/p left mastectomy (0/6 nodes) 2001 with Dr Kandi Hanson. Postop Tamoxifen x 5 years. She has also had 3 benign right breast biopsies in the past. She has not felt any breast masses, no nipple discharge, no left arm pain or swelling.     Patient's mother had breast cancer age 80.      Right mammogram today  BIRADS 2C.     Past Medical History:   Diagnosis Date    Cancer [facility])     left breast cancer s/p mastectomy    Essential hypertension, benign     Fibrocystic disease of breast     Fracture of finger of right hand     5th finger , fx after injury , numerous years ago     Lump or mass in breast     Other and unspecified hyperlipidemia     Ovarian cyst     Peptic ulcer, unspecified site, unspecified as acute or chronic, without mention of hemorrhage, perforation, or obstruction     Personal history of malignant neoplasm of breast     Senile osteoporosis 11/16/2018    Varicose veins of lower extremities        Past Surgical History:   Procedure Laterality Date    APPENDECTOMY      BREAST BIOPSY Left 2001    hx of mastectomy     HYSTERECTOMY      MASTECTOMY  2001    left    TONSILLECTOMY         Current Outpatient Medications   Medication Sig Dispense Refill    denosumab (PROLIA) 60 MG/ML SOLN SC injection Inject 60 mg into the skin once      Misc Natural Products (GLUCOSAMINE CHOND COMPLEX/MSM PO) Take by mouth      diphenhydrAMINE (BENADRYL) 25 MG tablet Take 25 mg by mouth every 6 hours as needed for Allergies (for allergies)      Wheat Dextrin (BENEFIBER) POWD Take 4 g by mouth daily      pantoprazole (PROTONIX) 40 MG tablet Take 40 mg by mouth daily   5    LIPITOR 40 MG tablet Take 20 mg by mouth nightly       losartan-hydrochlorothiazide (HYZAAR) 100-25 MG per tablet Take by mouth nightly       alprazolam (XANAX) 0.25 MG tablet Take 0.25 mg by mouth as needed.  Multiple Vitamins-Minerals (CENTRUM SILVER ULTRA WOMENS PO) Take by mouth daily        No current facility-administered medications for this visit. Social History     Socioeconomic History    Marital status:      Spouse name: Not on file    Number of children: 1    Years of education: Not on file    Highest education level: Not on file   Occupational History    Not on file   Tobacco Use    Smoking status: Never Smoker    Smokeless tobacco: Never Used   Vaping Use    Vaping Use: Never used   Substance and Sexual Activity    Alcohol use: No    Drug use: No    Sexual activity: Not on file   Other Topics Concern    Not on file   Social History Narrative    Not on file     Social Determinants of Health     Financial Resource Strain:     Difficulty of Paying Living Expenses:    Food Insecurity:     Worried About Running Out of Food in the Last Year:     [address] in the Last Year:    Transportation Needs:     Lack of Transportation (Medical):  Lack of Transportation (Non-Medical):    Physical Activity:     Days of Exercise per Week:     Minutes of Exercise per Session:    Stress:     Feeling of Stress :    Social Connections:     Frequency of Communication with Friends and Family:     Frequency of Social Gatherings with Friends and Family:     Attends Roman Catholic Services:     Active Member of Clubs or Organizations:     Attends Club or Organization Meetings:     Marital Status:    Intimate Partner Violence:     Fear of Current or Ex-Partner:     Emotionally Abused:     Physically Abused:     Sexually Abused:        Objective:   Physical Exam    Right breast -area of thickened breast tissue 12:00, 2-4 cm from nipple, no discrete mass, no nipple or skin changes. Left mastectomy site - well healed scar, no masses, no skin changes. No cervical or axillary adenopathy. Assessment:      Diagnosis Orders   1.  Malignant neoplasm of nipple of left breast in female, estrogen receptor positive ([facility] [address]     2. S/P left mastectomy     3. Visit for screening mammogram  YUN GOMEZ DIGITAL SCREEN UNILATERAL RIGHT          Plan:     Mammogram was reviewed. At the present time, there is no concern for breast cancer recurrence. Healthy lifestyle modifications were reviewed with the patient. Continue monthly self breast exam, f/u with me in 1 year with right mammogram.      On this date 08/30/21 I have spent 20 minutes reviewing previous notes, test results, and face to face with the patient discussing the diagnosis and importance of compliance with the treatment plan as well as documenting on the day of the visit.      Electronically signed by Jasvir Acuña MD on 8/30/2021 at 9:51 AM

## 2022-08-31 ENCOUNTER — HOSPITAL ENCOUNTER (OUTPATIENT)
Dept: WOMENS IMAGING | Age: 77
Discharge: HOME | End: 2022-08-31
Payer: MEDICARE

## 2022-08-31 ENCOUNTER — OFFICE VISIT (OUTPATIENT)
Dept: BREAST CENTER | Age: 77
End: 2022-08-31
Payer: MEDICARE

## 2022-08-31 VITALS
DIASTOLIC BLOOD PRESSURE: 80 MMHG | OXYGEN SATURATION: 100 % | WEIGHT: 160 LBS | SYSTOLIC BLOOD PRESSURE: 130 MMHG | HEART RATE: 69 BPM | RESPIRATION RATE: 16 BRPM | HEIGHT: 65 IN | BODY MASS INDEX: 26.66 KG/M2

## 2022-08-31 DIAGNOSIS — Z17.0 MALIGNANT NEOPLASM OF NIPPLE OF LEFT BREAST IN FEMALE, ESTROGEN RECEPTOR POSITIVE (HCC): Primary | ICD-10-CM

## 2022-08-31 DIAGNOSIS — C50.012 MALIGNANT NEOPLASM OF NIPPLE OF LEFT BREAST IN FEMALE, ESTROGEN RECEPTOR POSITIVE (HCC): Primary | ICD-10-CM

## 2022-08-31 DIAGNOSIS — Z12.31 VISIT FOR SCREENING MAMMOGRAM: ICD-10-CM

## 2022-08-31 DIAGNOSIS — Z90.12 S/P LEFT MASTECTOMY: ICD-10-CM

## 2022-08-31 PROCEDURE — G8427 DOCREV CUR MEDS BY ELIG CLIN: HCPCS | Performed by: SURGERY

## 2022-08-31 PROCEDURE — G8399 PT W/DXA RESULTS DOCUMENT: HCPCS | Performed by: SURGERY

## 2022-08-31 PROCEDURE — 1036F TOBACCO NON-USER: CPT | Performed by: SURGERY

## 2022-08-31 PROCEDURE — 99213 OFFICE O/P EST LOW 20 MIN: CPT | Performed by: SURGERY

## 2022-08-31 PROCEDURE — 1090F PRES/ABSN URINE INCON ASSESS: CPT | Performed by: SURGERY

## 2022-08-31 PROCEDURE — 1123F ACP DISCUSS/DSCN MKR DOCD: CPT | Performed by: SURGERY

## 2022-08-31 PROCEDURE — 77063 BREAST TOMOSYNTHESIS BI: CPT

## 2022-08-31 PROCEDURE — G8417 CALC BMI ABV UP PARAM F/U: HCPCS | Performed by: SURGERY

## 2022-08-31 NOTE — PROGRESS NOTES
alprazolam (XANAX) 0.25 MG tablet Take 0.25 mg by mouth as needed. Multiple Vitamins-Minerals (CENTRUM SILVER ULTRA WOMENS PO) Take by mouth daily        No current facility-administered medications for this visit. Social History     Socioeconomic History    Marital status:      Spouse name: Not on file    Number of children: 1    Years of education: Not on file    Highest education level: Not on file   Occupational History    Not on file   Tobacco Use    Smoking status: Never    Smokeless tobacco: Never   Vaping Use    Vaping Use: Never used   Substance and Sexual Activity    Alcohol use: No    Drug use: No    Sexual activity: Not on file   Other Topics Concern    Not on file   Social History Narrative    Not on file     Social Determinants of Health     Financial Resource Strain: Not on file   Food Insecurity: Not on file   Transportation Needs: Not on file   Physical Activity: Not on file   Stress: Not on file   Social Connections: Not on file   Intimate Partner Violence: Not on file   Housing Stability: Not on file       Objective:   Physical Exam    Right breast -area of thickened breast tissue 12:00, 2-4 cm from nipple, no discrete mass, no nipple or skin changes. Left mastectomy site - well healed scar with central thickening, no masses, no skin changes. No cervical or axillary adenopathy. Assessment:      Diagnosis Orders   1. Malignant neoplasm of nipple of left breast in female, estrogen receptor positive ([facility] [address]        2. S/P left mastectomy        3. Visit for screening mammogram               Plan:     Mammogram was reviewed. No masses on exam At the present time, there is no concern for breast cancer recurrence. Healthy lifestyle modifications were reviewed with the patient.   Continue monthly self breast exam, f/u with me in 1 year with right screening mammogram.      On this date 08/31/22 I have spent 20 minutes reviewing previous notes, test results, and face to face with the patient discussing the diagnosis and importance of compliance with the treatment plan as well as documenting on the day of the visit.      Electronically signed by Jh Abbott MD on 8/31/2022 at 10:05 AM

## 2023-11-22 ENCOUNTER — HOSPITAL ENCOUNTER (OUTPATIENT)
Dept: WOMENS IMAGING | Age: 78
Discharge: HOME | End: 2023-11-22
Payer: MEDICARE

## 2023-11-22 ENCOUNTER — OFFICE VISIT (OUTPATIENT)
Dept: BREAST CENTER | Age: 78
End: 2023-11-22
Payer: MEDICARE

## 2023-11-22 VITALS
DIASTOLIC BLOOD PRESSURE: 78 MMHG | BODY MASS INDEX: 26.66 KG/M2 | HEART RATE: 69 BPM | RESPIRATION RATE: 16 BRPM | OXYGEN SATURATION: 100 % | HEIGHT: 65 IN | SYSTOLIC BLOOD PRESSURE: 131 MMHG | WEIGHT: 160 LBS

## 2023-11-22 DIAGNOSIS — Z12.31 VISIT FOR SCREENING MAMMOGRAM: ICD-10-CM

## 2023-11-22 DIAGNOSIS — Z17.0 MALIGNANT NEOPLASM OF NIPPLE OF LEFT BREAST IN FEMALE, ESTROGEN RECEPTOR POSITIVE (HCC): Primary | ICD-10-CM

## 2023-11-22 DIAGNOSIS — C50.012 MALIGNANT NEOPLASM OF NIPPLE OF LEFT BREAST IN FEMALE, ESTROGEN RECEPTOR POSITIVE (HCC): Primary | ICD-10-CM

## 2023-11-22 DIAGNOSIS — Z90.12 S/P LEFT MASTECTOMY: ICD-10-CM

## 2023-11-22 PROCEDURE — 1123F ACP DISCUSS/DSCN MKR DOCD: CPT | Performed by: SURGERY

## 2023-11-22 PROCEDURE — G8484 FLU IMMUNIZE NO ADMIN: HCPCS | Performed by: SURGERY

## 2023-11-22 PROCEDURE — G8419 CALC BMI OUT NRM PARAM NOF/U: HCPCS | Performed by: SURGERY

## 2023-11-22 PROCEDURE — 1090F PRES/ABSN URINE INCON ASSESS: CPT | Performed by: SURGERY

## 2023-11-22 PROCEDURE — G8399 PT W/DXA RESULTS DOCUMENT: HCPCS | Performed by: SURGERY

## 2023-11-22 PROCEDURE — 3075F SYST BP GE 130 - 139MM HG: CPT | Performed by: SURGERY

## 2023-11-22 PROCEDURE — G8428 CUR MEDS NOT DOCUMENT: HCPCS | Performed by: SURGERY

## 2023-11-22 PROCEDURE — 1036F TOBACCO NON-USER: CPT | Performed by: SURGERY

## 2023-11-22 PROCEDURE — 77063 BREAST TOMOSYNTHESIS BI: CPT

## 2023-11-22 PROCEDURE — 3078F DIAST BP <80 MM HG: CPT | Performed by: SURGERY

## 2023-11-22 PROCEDURE — 99213 OFFICE O/P EST LOW 20 MIN: CPT | Performed by: SURGERY

## 2023-11-22 NOTE — PATIENT INSTRUCTIONS
Mammogram reviewed, no concerning findings  Breast exam performed, no palpable masses. Continue self breast exams    Healthy Lifestyle Recommendations: healthy diet (decrease consumption of red meat, increase fresh fruits and vegetables), decreased alcohol consumption (less than 4 drinks/week), adequate sleep (goal 6-8 hours), routine exercise (goal 150 minutes/week or greater), weight control.      Return: around 11/23/24 for right screening mammogram

## 2024-11-25 ENCOUNTER — OFFICE VISIT (OUTPATIENT)
Dept: BREAST CENTER | Age: 79
End: 2024-11-25
Payer: MEDICARE

## 2024-11-25 ENCOUNTER — HOSPITAL ENCOUNTER (OUTPATIENT)
Dept: WOMENS IMAGING | Age: 79
Discharge: HOME | End: 2024-11-25
Payer: MEDICARE

## 2024-11-25 VITALS — WEIGHT: 154 LBS | BODY MASS INDEX: 25.66 KG/M2 | HEIGHT: 65 IN

## 2024-11-25 VITALS — BODY MASS INDEX: 25.63 KG/M2 | HEIGHT: 65 IN | RESPIRATION RATE: 17 BRPM

## 2024-11-25 DIAGNOSIS — Z17.0 MALIGNANT NEOPLASM OF NIPPLE OF LEFT BREAST IN FEMALE, ESTROGEN RECEPTOR POSITIVE (HCC): Primary | ICD-10-CM

## 2024-11-25 DIAGNOSIS — Z90.12 S/P LEFT MASTECTOMY: ICD-10-CM

## 2024-11-25 DIAGNOSIS — Z17.0 MALIGNANT NEOPLASM OF NIPPLE OF LEFT BREAST IN FEMALE, ESTROGEN RECEPTOR POSITIVE (HCC): ICD-10-CM

## 2024-11-25 DIAGNOSIS — Z12.31 VISIT FOR SCREENING MAMMOGRAM: ICD-10-CM

## 2024-11-25 DIAGNOSIS — C50.012 MALIGNANT NEOPLASM OF NIPPLE OF LEFT BREAST IN FEMALE, ESTROGEN RECEPTOR POSITIVE (HCC): ICD-10-CM

## 2024-11-25 DIAGNOSIS — C50.012 MALIGNANT NEOPLASM OF NIPPLE OF LEFT BREAST IN FEMALE, ESTROGEN RECEPTOR POSITIVE (HCC): Primary | ICD-10-CM

## 2024-11-25 PROCEDURE — 1090F PRES/ABSN URINE INCON ASSESS: CPT | Performed by: SURGERY

## 2024-11-25 PROCEDURE — 1036F TOBACCO NON-USER: CPT | Performed by: SURGERY

## 2024-11-25 PROCEDURE — G2211 COMPLEX E/M VISIT ADD ON: HCPCS | Performed by: SURGERY

## 2024-11-25 PROCEDURE — 1159F MED LIST DOCD IN RCRD: CPT | Performed by: SURGERY

## 2024-11-25 PROCEDURE — 1123F ACP DISCUSS/DSCN MKR DOCD: CPT | Performed by: SURGERY

## 2024-11-25 PROCEDURE — 99213 OFFICE O/P EST LOW 20 MIN: CPT | Performed by: SURGERY

## 2024-11-25 PROCEDURE — G8484 FLU IMMUNIZE NO ADMIN: HCPCS | Performed by: SURGERY

## 2024-11-25 PROCEDURE — 77063 BREAST TOMOSYNTHESIS BI: CPT

## 2024-11-25 PROCEDURE — G8427 DOCREV CUR MEDS BY ELIG CLIN: HCPCS | Performed by: SURGERY

## 2024-11-25 PROCEDURE — G8419 CALC BMI OUT NRM PARAM NOF/U: HCPCS | Performed by: SURGERY

## 2024-11-25 PROCEDURE — G8399 PT W/DXA RESULTS DOCUMENT: HCPCS | Performed by: SURGERY

## 2024-11-25 NOTE — PATIENT INSTRUCTIONS
Mammogram reviewed, no concerning findings  Breast exam performed, no palpable masses.    Continue self breast exams    Healthy Lifestyle Recommendations: healthy diet (decrease consumption of red meat, increase fresh fruits and vegetables), decreased alcohol consumption (less than 4 drinks/week), adequate sleep (goal 6-8 hours), routine exercise (goal 150 minutes/week or greater), weight control.     Return:

## 2024-11-25 NOTE — PROGRESS NOTES
11/25/2024    Chief Complaint   Patient presents with    1 Year Follow Up     Yearly exam        HPI  Patient is s/p left mastectomy (0/6 nodes) 2001 with Dr Woodson. Postop Tamoxifen x 5 years. She has also had 3 benign right breast biopsies in the past. She has not felt any breast masses, no nipple discharge, no left arm pain or swelling.     Patient's mother had breast cancer age 90.      Right screening mammogram today BIRADS 2B.      Current Outpatient Medications:     denosumab (PROLIA) 60 MG/ML SOLN SC injection, Inject 60 mg into the skin once, Disp: , Rfl:     Misc Natural Products (GLUCOSAMINE CHOND COMPLEX/MSM PO), Take by mouth, Disp: , Rfl:     diphenhydrAMINE (BENADRYL) 25 MG tablet, Take 25 mg by mouth every 6 hours as needed for Allergies (for allergies), Disp: , Rfl:     Wheat Dextrin (BENEFIBER) POWD, Take 4 g by mouth daily, Disp: , Rfl:     pantoprazole (PROTONIX) 40 MG tablet, Take 40 mg by mouth daily , Disp: , Rfl: 5    LIPITOR 40 MG tablet, Take 20 mg by mouth nightly , Disp: , Rfl:     losartan-hydrochlorothiazide (HYZAAR) 100-25 MG per tablet, Take by mouth nightly , Disp: , Rfl:     alprazolam (XANAX) 0.25 MG tablet, Take 0.25 mg by mouth as needed. , Disp: , Rfl:     Multiple Vitamins-Minerals (CENTRUM SILVER ULTRA WOMENS PO), Take by mouth daily , Disp: , Rfl:       Past Medical History:   Diagnosis Date    Cancer (HCC)     left breast cancer s/p mastectomy    Essential hypertension, benign     Fibrocystic disease of breast     Fracture of finger of right hand     5th finger , fx after injury , numerous years ago     Lump or mass in breast     Other and unspecified hyperlipidemia     Ovarian cyst     Peptic ulcer, unspecified site, unspecified as acute or chronic, without mention of hemorrhage, perforation, or obstruction     Personal history of malignant neoplasm of breast     Senile osteoporosis 11/16/2018    Varicose veins of lower extremities        Past Surgical History:

## 2025-04-24 ENCOUNTER — HOSPITAL ENCOUNTER (INPATIENT)
Age: 80
LOS: 1 days | Discharge: HOME | DRG: 149 | End: 2025-04-26
Attending: EMERGENCY MEDICINE | Admitting: FAMILY MEDICINE
Payer: MEDICARE

## 2025-04-24 ENCOUNTER — APPOINTMENT (OUTPATIENT)
Dept: GENERAL RADIOLOGY | Age: 80
DRG: 149 | End: 2025-04-24
Payer: MEDICARE

## 2025-04-24 ENCOUNTER — APPOINTMENT (OUTPATIENT)
Age: 80
DRG: 149 | End: 2025-04-24
Attending: FAMILY MEDICINE
Payer: MEDICARE

## 2025-04-24 DIAGNOSIS — R00.2 PALPITATIONS: ICD-10-CM

## 2025-04-24 DIAGNOSIS — T67.1XXA HEAT SYNCOPE, INITIAL ENCOUNTER: ICD-10-CM
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
## 2025-04-24 DIAGNOSIS — R42 EPISODIC LIGHTHEADEDNESS: Primary | ICD-10-CM

## 2025-04-24 DIAGNOSIS — N39.0 URINARY TRACT INFECTION IN FEMALE: ICD-10-CM
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
## 2025-04-24 DIAGNOSIS — R79.89 ELEVATED TROPONIN: ICD-10-CM

## 2025-04-24 DIAGNOSIS — S20.229A CONTUSION OF BACK, UNSPECIFIED LATERALITY, INITIAL ENCOUNTER: ICD-10-CM

## 2025-04-24 LAB
ALBUMIN SERPL-MCNC: 4.2 G/DL (ref 3.4–5)
ALBUMIN/GLOB SERPL: 1.7 {RATIO} (ref 1.1–2.2)
ALP SERPL-CCNC: 87 U/L (ref 40–129)
ALT SERPL-CCNC: 33 U/L (ref 10–40)
ANION GAP SERPL CALCULATED.3IONS-SCNC: 16 MMOL/L (ref 3–16)
AST SERPL-CCNC: 33 U/L (ref 15–37)
BACTERIA URNS QL MICRO: ABNORMAL /HPF
BASOPHILS # BLD: 0.1 K/UL (ref 0–0.2)
BASOPHILS NFR BLD: 0.3 %
BILIRUB SERPL-MCNC: 0.7 MG/DL (ref 0–1)
BILIRUB UR QL STRIP.AUTO: NEGATIVE
BUN SERPL-MCNC: 17 MG/DL (ref 7–20)
CALCIUM SERPL-MCNC: 10.3 MG/DL (ref 8.3–10.6)
CHLORIDE SERPL-SCNC: 103 MMOL/L (ref 99–110)
CLARITY UR: ABNORMAL
CO2 SERPL-SCNC: 21 MMOL/L (ref 21–32)
COLOR UR: YELLOW
CREAT SERPL-MCNC: 0.7 MG/DL (ref 0.6–1.2)
DEPRECATED RDW RBC AUTO: 13.7 % (ref 12.4–15.4)
EKG ATRIAL RATE: 74 BPM
EKG DIAGNOSIS: NORMAL
EKG P AXIS: 39 DEGREES
EKG P-R INTERVAL: 138 MS
EKG Q-T INTERVAL: 444 MS
EKG QRS DURATION: 76 MS
EKG QTC CALCULATION (BAZETT): 492 MS
EKG R AXIS: 51 DEGREES
EKG T AXIS: 72 DEGREES
EKG VENTRICULAR RATE: 74 BPM
EOSINOPHIL # BLD: 0.1 K/UL (ref 0–0.6)
EOSINOPHIL NFR BLD: 0.3 %
EPI CELLS #/AREA URNS AUTO: 2 /HPF (ref 0–5)
GFR SERPLBLD CREATININE-BSD FMLA CKD-EPI: 88 ML/MIN/{1.73_M2}
GLUCOSE SERPL-MCNC: 140 MG/DL (ref 70–99)
GLUCOSE UR STRIP.AUTO-MCNC: NEGATIVE MG/DL
HCT VFR BLD AUTO: 38.9 % (ref 36–48)
HGB BLD-MCNC: 13 G/DL (ref 12–16)
HGB UR QL STRIP.AUTO: NEGATIVE
HYALINE CASTS #/AREA URNS AUTO: 0 /LPF (ref 0–8)
KETONES UR STRIP.AUTO-MCNC: ABNORMAL MG/DL
LEUKOCYTE ESTERASE UR QL STRIP.AUTO: ABNORMAL
LYMPHOCYTES # BLD: 1.6 K/UL (ref 1–5.1)
LYMPHOCYTES NFR BLD: 8.5 %
MAGNESIUM SERPL-MCNC: 1.9 MG/DL (ref 1.8–2.4)
MCH RBC QN AUTO: 30.7 PG (ref 26–34)
MCHC RBC AUTO-ENTMCNC: 33.5 G/DL (ref 31–36)
MCV RBC AUTO: 91.7 FL (ref 80–100)
MONOCYTES # BLD: 1.1 K/UL (ref 0–1.3)
MONOCYTES NFR BLD: 6.1 %
NEUTROPHILS # BLD: 16 K/UL (ref 1.7–7.7)
NEUTROPHILS NFR BLD: 84.8 %
NITRITE UR QL STRIP.AUTO: NEGATIVE
PH UR STRIP.AUTO: 6.5 [PH] (ref 5–8)
PLATELET # BLD AUTO: 288 K/UL (ref 135–450)
PMV BLD AUTO: 9.3 FL (ref 5–10.5)
POTASSIUM SERPL-SCNC: 3.5 MMOL/L (ref 3.5–5.1)
PROT SERPL-MCNC: 6.7 G/DL (ref 6.4–8.2)
PROT UR STRIP.AUTO-MCNC: ABNORMAL MG/DL
RBC # BLD AUTO: 4.24 M/UL (ref 4–5.2)
RBC CLUMPS #/AREA URNS AUTO: 3 /HPF (ref 0–4)
SODIUM SERPL-SCNC: 140 MMOL/L (ref 136–145)
SP GR UR STRIP.AUTO: 1.03 (ref 1–1.03)
TROPONIN, HIGH SENSITIVITY: 19 NG/L (ref 0–14)
TROPONIN, HIGH SENSITIVITY: 20 NG/L (ref 0–14)
TROPONIN, HIGH SENSITIVITY: 33 NG/L (ref 0–14)
TSH SERPL DL<=0.005 MIU/L-ACNC: 0.91 UIU/ML (ref 0.27–4.2)
UA COMPLETE W REFLEX CULTURE PNL UR: YES
UA DIPSTICK W REFLEX MICRO PNL UR: YES
URN SPEC COLLECT METH UR: ABNORMAL
UROBILINOGEN UR STRIP-ACNC: 1 E.U./DL
WBC # BLD AUTO: 18.8 K/UL (ref 4–11)
WBC #/AREA URNS AUTO: 21 /HPF (ref 0–5)

## 2025-04-24 PROCEDURE — 85025 COMPLETE CBC W/AUTO DIFF WBC: CPT

## 2025-04-24 PROCEDURE — 36415 COLL VENOUS BLD VENIPUNCTURE: CPT

## 2025-04-24 PROCEDURE — 71045 X-RAY EXAM CHEST 1 VIEW: CPT

## 2025-04-24 PROCEDURE — 81001 URINALYSIS AUTO W/SCOPE: CPT

## 2025-04-24 PROCEDURE — 2500000003 HC RX 250 WO HCPCS: Performed by: PHYSICIAN ASSISTANT

## 2025-04-24 PROCEDURE — 6360000002 HC RX W HCPCS: Performed by: FAMILY MEDICINE

## 2025-04-24 PROCEDURE — 87086 URINE CULTURE/COLONY COUNT: CPT

## 2025-04-24 PROCEDURE — 80053 COMPREHEN METABOLIC PANEL: CPT

## 2025-04-24 PROCEDURE — 2500000003 HC RX 250 WO HCPCS: Performed by: FAMILY MEDICINE

## 2025-04-24 PROCEDURE — G0378 HOSPITAL OBSERVATION PER HR: HCPCS

## 2025-04-24 PROCEDURE — 93306 TTE W/DOPPLER COMPLETE: CPT

## 2025-04-24 PROCEDURE — 93010 ELECTROCARDIOGRAM REPORT: CPT | Performed by: INTERNAL MEDICINE

## 2025-04-24 PROCEDURE — 83735 ASSAY OF MAGNESIUM: CPT

## 2025-04-24 PROCEDURE — 6370000000 HC RX 637 (ALT 250 FOR IP): Performed by: FAMILY MEDICINE

## 2025-04-24 PROCEDURE — 6360000002 HC RX W HCPCS: Performed by: PHYSICIAN ASSISTANT

## 2025-04-24 PROCEDURE — 96374 THER/PROPH/DIAG INJ IV PUSH: CPT

## 2025-04-24 PROCEDURE — 96372 THER/PROPH/DIAG INJ SC/IM: CPT

## 2025-04-24 PROCEDURE — 99285 EMERGENCY DEPT VISIT HI MDM: CPT

## 2025-04-24 PROCEDURE — 93005 ELECTROCARDIOGRAM TRACING: CPT | Performed by: EMERGENCY MEDICINE

## 2025-04-24 PROCEDURE — 84484 ASSAY OF TROPONIN QUANT: CPT

## 2025-04-24 PROCEDURE — 84443 ASSAY THYROID STIM HORMONE: CPT

## 2025-04-24 RX ORDER — ACETAMINOPHEN 325 MG/1
650 TABLET ORAL EVERY 6 HOURS PRN
Status: DISCONTINUED | OUTPATIENT
Start: 2025-04-24 | End: 2025-04-26 | Stop reason: HOSPADM

## 2025-04-24 RX ORDER — FUROSEMIDE 10 MG/ML
20 INJECTION INTRAMUSCULAR; INTRAVENOUS ONCE
Status: DISCONTINUED | OUTPATIENT
Start: 2025-04-24 | End: 2025-04-24

## 2025-04-24 RX ORDER — ALPRAZOLAM 0.25 MG
0.25 TABLET ORAL 3 TIMES DAILY PRN
Status: DISCONTINUED | OUTPATIENT
Start: 2025-04-24 | End: 2025-04-26 | Stop reason: HOSPADM

## 2025-04-24 RX ORDER — POTASSIUM CHLORIDE 7.45 MG/ML
10 INJECTION INTRAVENOUS PRN
Status: DISCONTINUED | OUTPATIENT
Start: 2025-04-24 | End: 2025-04-26 | Stop reason: HOSPADM

## 2025-04-24 RX ORDER — POLYETHYLENE GLYCOL 3350 17 G/17G
17 POWDER, FOR SOLUTION ORAL DAILY PRN
Status: DISCONTINUED | OUTPATIENT
Start: 2025-04-24 | End: 2025-04-26 | Stop reason: HOSPADM

## 2025-04-24 RX ORDER — ONDANSETRON 4 MG/1
4 TABLET, ORALLY DISINTEGRATING ORAL EVERY 8 HOURS PRN
Status: DISCONTINUED | OUTPATIENT
Start: 2025-04-24 | End: 2025-04-26 | Stop reason: HOSPADM

## 2025-04-24 RX ORDER — LOSARTAN POTASSIUM 100 MG/1
100 TABLET ORAL NIGHTLY
Status: DISCONTINUED | OUTPATIENT
Start: 2025-04-24 | End: 2025-04-26 | Stop reason: HOSPADM

## 2025-04-24 RX ORDER — ACETAMINOPHEN 650 MG/1
650 SUPPOSITORY RECTAL EVERY 6 HOURS PRN
Status: DISCONTINUED | OUTPATIENT
Start: 2025-04-24 | End: 2025-04-26 | Stop reason: HOSPADM

## 2025-04-24 RX ORDER — PANTOPRAZOLE SODIUM 40 MG/1
40 TABLET, DELAYED RELEASE ORAL
Status: DISCONTINUED | OUTPATIENT
Start: 2025-04-25 | End: 2025-04-26 | Stop reason: HOSPADM

## 2025-04-24 RX ORDER — ONDANSETRON 2 MG/ML
4 INJECTION INTRAMUSCULAR; INTRAVENOUS EVERY 6 HOURS PRN
Status: DISCONTINUED | OUTPATIENT
Start: 2025-04-24 | End: 2025-04-26 | Stop reason: HOSPADM

## 2025-04-24 RX ORDER — MAGNESIUM SULFATE IN WATER 40 MG/ML
2000 INJECTION, SOLUTION INTRAVENOUS PRN
Status: DISCONTINUED | OUTPATIENT
Start: 2025-04-24 | End: 2025-04-26 | Stop reason: HOSPADM

## 2025-04-24 RX ORDER — LOSARTAN POTASSIUM AND HYDROCHLOROTHIAZIDE 25; 100 MG/1; MG/1
1 TABLET ORAL NIGHTLY
Status: DISCONTINUED | OUTPATIENT
Start: 2025-04-24 | End: 2025-04-24

## 2025-04-24 RX ORDER — ACETAMINOPHEN 325 MG/1
650 TABLET ORAL EVERY 6 HOURS PRN
COMMUNITY

## 2025-04-24 RX ORDER — SODIUM CHLORIDE 0.9 % (FLUSH) 0.9 %
5-40 SYRINGE (ML) INJECTION EVERY 12 HOURS SCHEDULED
Status: DISCONTINUED | OUTPATIENT
Start: 2025-04-24 | End: 2025-04-26 | Stop reason: HOSPADM

## 2025-04-24 RX ORDER — SODIUM CHLORIDE 0.9 % (FLUSH) 0.9 %
5-40 SYRINGE (ML) INJECTION PRN
Status: DISCONTINUED | OUTPATIENT
Start: 2025-04-24 | End: 2025-04-26 | Stop reason: HOSPADM

## 2025-04-24 RX ORDER — ENOXAPARIN SODIUM 100 MG/ML
40 INJECTION SUBCUTANEOUS NIGHTLY
Status: DISCONTINUED | OUTPATIENT
Start: 2025-04-24 | End: 2025-04-26 | Stop reason: HOSPADM

## 2025-04-24 RX ORDER — SODIUM CHLORIDE 9 MG/ML
INJECTION, SOLUTION INTRAVENOUS PRN
Status: DISCONTINUED | OUTPATIENT
Start: 2025-04-24 | End: 2025-04-26 | Stop reason: HOSPADM

## 2025-04-24 RX ORDER — POTASSIUM CHLORIDE 1500 MG/1
40 TABLET, EXTENDED RELEASE ORAL PRN
Status: DISCONTINUED | OUTPATIENT
Start: 2025-04-24 | End: 2025-04-26 | Stop reason: HOSPADM

## 2025-04-24 RX ORDER — ATORVASTATIN CALCIUM 20 MG/1
20 TABLET, FILM COATED ORAL NIGHTLY
COMMUNITY
Start: 2025-04-18

## 2025-04-24 RX ORDER — ATORVASTATIN CALCIUM 20 MG/1
20 TABLET, FILM COATED ORAL NIGHTLY
Status: DISCONTINUED | OUTPATIENT
Start: 2025-04-24 | End: 2025-04-26 | Stop reason: HOSPADM

## 2025-04-24 RX ORDER — HYDROCHLOROTHIAZIDE 25 MG/1
25 TABLET ORAL NIGHTLY
Status: DISCONTINUED | OUTPATIENT
Start: 2025-04-24 | End: 2025-04-26 | Stop reason: HOSPADM

## 2025-04-24 RX ORDER — WHEAT DEXTRIN 3 G/3.8 G
4 POWDER (GRAM) ORAL DAILY
Status: DISCONTINUED | OUTPATIENT
Start: 2025-04-24 | End: 2025-04-24 | Stop reason: RX

## 2025-04-24 RX ADMIN — POTASSIUM CHLORIDE 40 MEQ: 1500 TABLET, EXTENDED RELEASE ORAL at 18:38

## 2025-04-24 RX ADMIN — ACETAMINOPHEN 650 MG: 325 TABLET ORAL at 16:27

## 2025-04-24 RX ADMIN — HYDROCHLOROTHIAZIDE 25 MG: 25 TABLET ORAL at 20:10

## 2025-04-24 RX ADMIN — ATORVASTATIN CALCIUM 20 MG: 20 TABLET, FILM COATED ORAL at 20:10

## 2025-04-24 RX ADMIN — LOSARTAN POTASSIUM 100 MG: 100 TABLET, FILM COATED ORAL at 20:09

## 2025-04-24 RX ADMIN — ACETAMINOPHEN 650 MG: 325 TABLET ORAL at 22:17

## 2025-04-24 RX ADMIN — ENOXAPARIN SODIUM 40 MG: 100 INJECTION SUBCUTANEOUS at 20:10

## 2025-04-24 RX ADMIN — SODIUM CHLORIDE, PRESERVATIVE FREE 10 ML: 5 INJECTION INTRAVENOUS at 20:14

## 2025-04-24 RX ADMIN — WATER 1000 MG: 1 INJECTION INTRAMUSCULAR; INTRAVENOUS; SUBCUTANEOUS at 13:04

## 2025-04-24 ASSESSMENT — PAIN DESCRIPTION - DESCRIPTORS
DESCRIPTORS: ACHING
DESCRIPTORS: SORE
DESCRIPTORS: ACHING

## 2025-04-24 ASSESSMENT — PAIN - FUNCTIONAL ASSESSMENT: PAIN_FUNCTIONAL_ASSESSMENT: 0-10

## 2025-04-24 ASSESSMENT — PAIN SCALES - GENERAL
PAINLEVEL_OUTOF10: 6
PAINLEVEL_OUTOF10: 1
PAINLEVEL_OUTOF10: 6
PAINLEVEL_OUTOF10: 4
PAINLEVEL_OUTOF10: 0
PAINLEVEL_OUTOF10: 6
PAINLEVEL_OUTOF10: 5

## 2025-04-24 ASSESSMENT — PAIN DESCRIPTION - LOCATION
LOCATION: BACK

## 2025-04-24 ASSESSMENT — PAIN DESCRIPTION - PAIN TYPE
TYPE: ACUTE PAIN

## 2025-04-24 ASSESSMENT — PAIN DESCRIPTION - ORIENTATION
ORIENTATION: MID

## 2025-04-24 ASSESSMENT — PAIN DESCRIPTION - FREQUENCY: FREQUENCY: CONTINUOUS

## 2025-04-24 ASSESSMENT — PAIN SCALES - WONG BAKER: WONGBAKER_NUMERICALRESPONSE: NO HURT

## 2025-04-24 NOTE — ED NOTES
Patient is choosing to keep their personal belongings in the hospital and has been notified that the hospital is not responsible for safeguarding any personal property or any other personal items kept in the hospital. Items have been placed in a Patient Belongings bag and labeled with a patient sticker. Patient/Family verbalized understanding.

## 2025-04-24 NOTE — PROGRESS NOTES
4 Eyes Skin Assessment     NAME:  Denae Hernandez  YOB: 1945  MEDICAL RECORD NUMBER:  [id number]    The patient is being assessed for  Admission    I agree that at least one RN has performed a thorough Head to Toe Skin Assessment on the patient. ALL assessment sites listed below have been assessed.      Areas assessed by both nurses:    Head, Face, Ears, Shoulders, Back, Chest, Arms, Elbows, Hands, Sacrum. Buttock, Coccyx, Ischium, and Legs. Feet and Heels        Does the Patient have a Wound? No noted wound(s)       Jude Prevention initiated by RN: No  Wound Care Orders initiated by RN: No    Pressure Injury (Stage 3,4, Unstageable, DTI, NWPT, and Complex wounds) if present, place Wound referral order by RN under : No    New Ostomies, if present place, Ostomy referral order under : No     Nurse 1 eSignature: Electronically signed by Kemi Gonsales RN on 4/24/25 at 4:21 PM EDT    **SHARE this note so that the co-signing nurse can place an eSignature**    Nurse 2 eSignature: Electronically signed by Denilson Lira RN on 4/24/25 at 5:01 PM EDT

## 2025-04-24 NOTE — H&P
V2.0  History and Physical      Name:  Denae Hernandez /Age/Sex: 1945  (79 y.o. female)   MRN & CSN:  [id number] & [id number] Encounter Date/Time: 2025 2:22 PM EDT   Location:  [address] [place] [address] PCP: Marcelino Means MD       Hospital Day: 1    Assessment and Plan:   Denae Hernandez is a 79 y.o. female with a pmh of essential hypertension who presents with Dizziness    Hospital Problems           Last Modified POA    * (Principal) Dizziness 2025 Yes       Plan:  Dizziness with syncope with palpitation   Symptoms ongoing for the last few months.  Feeling lightheaded, dizzy, palpitations  Was following up with cardiology at [facility] recommended an echo and Holter monitor, has an appointment in may  EKG here   Will obtain a cardiac echo here  Will obtain orthostatic vitals  Cardio consult   PT OT to evaluate    UTI  Bacteriuria on presentation, wbc at 18.8  Rocephin 1 g IV daily  Follow urine culture    3.  Anxiety  Xanax 0.5 mg 3 times a day as needed for anxiety    4.  Essential hypertension  Losartan hydrochlorothiazide 100-25 mg daily    5.  GERD  Protonix 40 mg daily    Will continue to follow, monitor and manage chronic medical conditions with medication listed below  Disposition:   Current Living situation: Home  Expected Disposition: Home  Estimated D/C: 2 to 3 days  Advanced care planning was discussed with patient for a total of 16 minutes.  Full code, DNR CC, DNR CCA, power of  and living will were discussed. Patient elected to be a full code  Diet No diet orders on file   DVT Prophylaxis [x] Lovenox, []  Heparin, [] SCDs, [x] Ambulation,  [] Eliquis, [] Xarelto, [] Coumadin   Code Status Full code   Surrogate Decision Maker/ POA Self     Personally reviewed Lab Studies and Imaging             History from:     patient, ED physician, medical record    History of Present Illness:     Chief Complaint: Dizziness, syncope  Denae Hernandez is a 79 y.o. female with pmh of essential

## 2025-04-24 NOTE — PROGRESS NOTES
Medication Reconciliation    List of medications patient is currently taking is complete.     Source of information: 1. Conversation with patient at bedside                                      2. EPIC records        Notes regarding home medications:   1. Patient only received acetaminophen prior to arrival to the emergency department.        Rosanna Cobb [facility], PharmD, 4/24/2025 1:28 PM

## 2025-04-24 NOTE — PROGRESS NOTES
Patient admitted to room 4273 from ED.  Alert&oriented x4. Pt is resting in bed and 6/10 back pain at this time; requesting tylenol. No s/s of distress noted. Assessment completed; VSS. Tele in place reading sinus rhythm with a rate of 92. Safety precautions in place; call light and bedside table within reach. Pt encouraged to call for needs or ambulation.  Pt verbalized understanding. Pt denies any needs at this time.

## 2025-04-24 NOTE — ED PROVIDER NOTES
[facility] EMERGENCY DEPARTMENT  EMERGENCY DEPARTMENT ENCOUNTER      Pt Name: Denae Hernandez  MRN: [id number]  Birthdate 1945  Date of evaluation: 4/24/2025  Provider: STU Lares  PCP: Marcelino Means MD  Note Started: 9:42 AM EDT     This patient was also seen and evaluated by Attending Physician No att. providers found.    CHIEF COMPLAINT       Chief Complaint   Patient presents with    Dizziness     Reports dizziness since 0300, states she fell in bathroom, denies hitting head, pt has scap to mid back. Reports still feeling dizzy.        HISTORY OF PRESENT ILLNESS   (Location, Timing/Onset, Context/Setting, Quality, Duration, Modifying Factors, Severity, Associated Signs and Symptoms)  Note limiting factors.     Denae Hernandez is a 79 y.o. female who presents with report of lightheadedness and a fall in the bathroom this morning.  Patient says over the past couple months she has had episodes of feeling very lightheaded, not vertigo or room spinning sensation, but like she might pass out, accompanied by palpitations.  No chest pain, and she has not lost consciousness.  She recently saw cardiology about this, has been ordered echocardiogram and Holter monitor.  She says she had an episode last night before going to bed, and then this morning when she got up she felt so lightheaded that she fell in the bathroom.  Struck the middle of her back on the table and has a scrape there, which is somewhat sore.  Did not hit her head.  No difficulty breathing.  She denies any cough, cold symptoms or fever recently.  No numbness in the extremities.  She has been eating normally, urinating normally.  No recent infections that she is aware of.  No prior history of heart problems.    Nursing Notes were all reviewed and agreed with or any disagreements were addressed in the HPI.    REVIEW OF SYSTEMS    (2-9 systems for level 4, 10 or more for level 5)     Positives and pertinent negatives as per  Cholesterol Mother         in stable health    Other Mother         varicose veins, in stable health        SOCIAL HISTORY       Social History     Tobacco Use    Smoking status: Never    Smokeless tobacco: Never   Vaping Use    Vaping status: Never Used   Substance Use Topics    Alcohol use: No    Drug use: No       SCREENINGS   NIH Stroke Scale  Interval: Baseline  Level of Consciousness (1a): Alert  LOC Questions (1b): Answers both correctly  LOC Commands (1c): Performs both tasks correctly  Best Gaze (2): Normal  Visual (3): No visual loss  Facial Palsy (4): Normal symmetrical movement  Motor Arm, Left (5a): No drift  Motor Arm, Right (5b): No drift  Motor Leg, Left (6a): No drift  Motor Leg, Right (6b): No drift  Limb Ataxia (7): Absent  Sensory (8): Normal  Best Language (9): No aphasia  Dysarthria (10): Normal  Extinction and Inattention (11): No abnormality  Total: 0  [place] Coma Scale  Eye Opening: Spontaneous  Best Verbal Response: Oriented  Best Motor Response: Obeys commands  Randall Coma Scale Score: 15           CIWA Assessment  BP: (!) 140/76  Pulse: 75          PHYSICAL EXAM    (up to 7 for level 4, 8 or more for level 5)     ED Triage Vitals [04/24/25 0920]   BP Systolic BP Percentile Diastolic BP Percentile Temp Temp Source Pulse Respirations SpO2   (!) 140/76 -- -- 97.5 °F (36.4 °C) Temporal 75 14 100 %      Height Weight - Scale         1.651 m (5' 5\") 71.2 kg (157 lb)             Physical Exam  Vitals and nursing note reviewed.   Constitutional:       General: She is not in acute distress.     Appearance: Normal appearance. She is not ill-appearing.   HENT:      Head: Normocephalic and atraumatic.      Nose: Nose normal.   Eyes:      General: No visual field deficit.        Right eye: No discharge.         Left eye: No discharge.   Cardiovascular:      Rate and Rhythm: Normal rate and regular rhythm.      Heart sounds: Normal heart sounds.   Pulmonary:      Effort: Pulmonary effort is normal.  within normal range but repeat troponin was elevated.  Given this and the patient's repeated episodes of lightheadedness leading to a fall, she would benefit from hospitalization for further care, with concern for arrhythmia.  I consulted the hospitalist, who agreed to accept and admit the patient.  The patient verbalized understanding and agreement with this plan of care.     CRITICAL CARE TIME   None.    FINAL IMPRESSION      1. Episodic lightheadedness    2. Fall in home, initial encounter    3. Elevated troponin    4. Contusion of back, unspecified laterality, initial encounter    5. Palpitations    6. Urinary tract infection in female          DISPOSITION/PLAN   DISPOSITION Decision To Admit 04/24/2025 12:50:31 PM   DISPOSITION CONDITION Stable           PATIENT REFERRED TO:  No follow-up provider specified.    DISCHARGE MEDICATIONS:  New Prescriptions    No medications on file       DISCONTINUED MEDICATIONS:  Discontinued Medications    LIPITOR 40 MG TABLET    Take 20 mg by mouth nightly             (Please note that portions of this note were completed with a voice recognition program.  Efforts were made to edit the dictations but occasionally words are mis-transcribed.)    STU Lares (electronically signed)        Nathan Medina PA  04/24/25 1232

## 2025-04-24 NOTE — ED PROVIDER NOTES
ED Attending Attestation Note    This patient was seen by the advanced practice provider.     I personally saw the patient. Management plan and care decisions have been discussed with me and I made/approved the management plan and take responsibility for patient management.     Briefly, 79 y.o. female w/ hx breast cancer 2001 presents with dizziness.   Lightheadedness with occasional palpitations  Denies vertigo  Denies chest pain  Has been present since November  Fell down today due to lightheadedness but did not lose consciousness, did not hit her head, scraped her back on a table.      Focused exam:   Gen: 79 y.o. female, NAD, nontoxic appearing  HEENT: NCAT. MMM, PERRL. EOMI.   CV: RRR w/o MRG  Vascular: intact and symmetric radial and DP pulses bilaterally  Lungs: CTAB. No incr WOB.   Abdomen: Soft, nontender, nondistended. No rebound/guarding.   Neuro: awake and alert, speech clear w/o aphasia; intact and symmetric strength and sensation in all 4 extremities, CN 2-12 intact bilaterally, NIH stroke scale 0      MDM:   This is a 79-year-old female presenting with dizziness.  Dizziness is described as lightheadedness.  She does not have vertigo.  She is not of any other focal neurologic deficit.   EKG does not show evidence of acute MI.  Palpitations in the context of lightheadedness, presyncope does raise concern for arrhythmia.  Initial troponin 20, repeat 33.  Given uptrending troponin and palpitations/lightheadedness, recommending admission for further cardiac evaluation.    She has a leukocytosis of 18.  UA shows some WBCs.  Will empirically treat given significant leukocytosis.  Demarginalization also possible.     Nursing notes, vital signs reviewed.     Records reviewed:   Negative stress test 2012    I independently interpreted the following studies   XR CHEST PORTABLE   Final Result   1. No acute findings              Labs Reviewed   CBC WITH AUTO DIFFERENTIAL - Abnormal; Notable for the following

## 2025-04-25 PROBLEM — R55 SYNCOPE AND COLLAPSE: Status: ACTIVE | Noted: 2025-04-25

## 2025-04-25 LAB
ALBUMIN SERPL-MCNC: 3.8 G/DL (ref 3.4–5)
ALBUMIN/GLOB SERPL: 1.8 {RATIO} (ref 1.1–2.2)
ALP SERPL-CCNC: 76 U/L (ref 40–129)
ALT SERPL-CCNC: 25 U/L (ref 10–40)
ANION GAP SERPL CALCULATED.3IONS-SCNC: 10 MMOL/L (ref 3–16)
AST SERPL-CCNC: 21 U/L (ref 15–37)
BACTERIA UR CULT: NORMAL
BILIRUB SERPL-MCNC: 0.8 MG/DL (ref 0–1)
BUN SERPL-MCNC: 13 MG/DL (ref 7–20)
CALCIUM SERPL-MCNC: 9.6 MG/DL (ref 8.3–10.6)
CHLORIDE SERPL-SCNC: 104 MMOL/L (ref 99–110)
CO2 SERPL-SCNC: 25 MMOL/L (ref 21–32)
CREAT SERPL-MCNC: 0.6 MG/DL (ref 0.6–1.2)
DEPRECATED RDW RBC AUTO: 13.4 % (ref 12.4–15.4)
ECHO AO ASC DIAM: 3.4 CM
ECHO AO ASCENDING AORTA INDEX: 1.91 CM/M2
ECHO AO ROOT DIAM: 3.2 CM
ECHO AO ROOT INDEX: 1.8 CM/M2
ECHO AV AREA PEAK VELOCITY: 2.9 CM2
ECHO AV AREA VTI: 2.8 CM2
ECHO AV AREA/BSA PEAK VELOCITY: 1.6 CM2/M2
ECHO AV AREA/BSA VTI: 1.6 CM2/M2
ECHO AV MEAN GRADIENT: 4 MMHG
ECHO AV MEAN VELOCITY: 0.9 M/S
ECHO AV PEAK GRADIENT: 8 MMHG
ECHO AV PEAK VELOCITY: 1.4 M/S
ECHO AV VELOCITY RATIO: 0.93
ECHO AV VTI: 24.9 CM
ECHO BSA: 1.81 M2
ECHO EST RA PRESSURE: 3 MMHG
ECHO IVC PROX: 2.1 CM
ECHO LA AREA 2C: 13.1 CM2
ECHO LA AREA 4C: 14.9 CM2
ECHO LA MAJOR AXIS: 5.9 CM
ECHO LA MINOR AXIS: 4.3 CM
ECHO LA VOL MOD A2C: 33 ML (ref 22–52)
ECHO LA VOL MOD A4C: 29 ML (ref 22–52)
ECHO LA VOLUME INDEX MOD A2C: 19 ML/M2 (ref 16–34)
ECHO LA VOLUME INDEX MOD A4C: 16 ML/M2 (ref 16–34)
ECHO LV E' LATERAL VELOCITY: 9.36 CM/S
ECHO LV E' SEPTAL VELOCITY: 4.79 CM/S
ECHO LV EDV A2C: 66 ML
ECHO LV EDV A4C: 67 ML
ECHO LV EDV INDEX A4C: 38 ML/M2
ECHO LV EDV NDEX A2C: 37 ML/M2
ECHO LV EF PHYSICIAN: 70 %
ECHO LV EJECTION FRACTION A2C: 72 %
ECHO LV EJECTION FRACTION A4C: 70 %
ECHO LV ESV A2C: 18 ML
ECHO LV ESV A4C: 20 ML
ECHO LV ESV INDEX A2C: 10 ML/M2
ECHO LV ESV INDEX A4C: 11 ML/M2
ECHO LV FRACTIONAL SHORTENING: 45 % (ref 28–44)
ECHO LV INTERNAL DIMENSION DIASTOLE INDEX: 2.36 CM/M2
ECHO LV INTERNAL DIMENSION DIASTOLIC: 4.2 CM (ref 3.9–5.3)
ECHO LV INTERNAL DIMENSION SYSTOLIC INDEX: 1.29 CM/M2
ECHO LV INTERNAL DIMENSION SYSTOLIC: 2.3 CM
ECHO LV IVSD: 1 CM (ref 0.6–0.9)
ECHO LV MASS 2D: 137.2 G (ref 67–162)
ECHO LV MASS INDEX 2D: 77.1 G/M2 (ref 43–95)
ECHO LV POSTERIOR WALL DIASTOLIC: 1 CM (ref 0.6–0.9)
ECHO LV RELATIVE WALL THICKNESS RATIO: 0.48
ECHO LVOT AREA: 3.1 CM2
ECHO LVOT AV VTI INDEX: 0.89
ECHO LVOT DIAM: 2 CM
ECHO LVOT MEAN GRADIENT: 3 MMHG
ECHO LVOT PEAK GRADIENT: 6 MMHG
ECHO LVOT PEAK VELOCITY: 1.3 M/S
ECHO LVOT STROKE VOLUME INDEX: 39.2 ML/M2
ECHO LVOT SV: 69.7 ML
ECHO LVOT VTI: 22.2 CM
ECHO MV A VELOCITY: 1.24 M/S
ECHO MV AREA VTI: 3.7 CM2
ECHO MV E DECELERATION TIME (DT): 161 MS
ECHO MV E VELOCITY: 0.84 M/S
ECHO MV E/A RATIO: 0.68
ECHO MV E/E' LATERAL: 8.97
ECHO MV E/E' RATIO (AVERAGED): 13.26
ECHO MV E/E' SEPTAL: 17.54
ECHO MV LVOT VTI INDEX: 0.85
ECHO MV MAX VELOCITY: 1.2 M/S
ECHO MV MEAN GRADIENT: 2 MMHG
ECHO MV MEAN VELOCITY: 0.6 M/S
ECHO MV PEAK GRADIENT: 5 MMHG
ECHO MV VTI: 18.8 CM
ECHO PV MAX VELOCITY: 0.8 M/S
ECHO PV PEAK GRADIENT: 2 MMHG
ECHO RA AREA 4C: 10.2 CM2
ECHO RA END SYSTOLIC VOLUME APICAL 4 CHAMBER INDEX BSA: 9 ML/M2
ECHO RA VOLUME: 16 ML
ECHO RIGHT VENTRICULAR SYSTOLIC PRESSURE (RVSP): 6 MMHG
ECHO RV BASAL DIMENSION: 2.8 CM
ECHO RV FREE WALL PEAK S': 15.5 CM/S
ECHO RV MID DIMENSION: 1.7 CM
ECHO RV TAPSE: 1.2 CM (ref 1.7–?)
ECHO TV REGURGITANT MAX VELOCITY: 0.9 M/S
ECHO TV REGURGITANT PEAK GRADIENT: 3 MMHG
GFR SERPLBLD CREATININE-BSD FMLA CKD-EPI: >90 ML/MIN/{1.73_M2}
GLUCOSE SERPL-MCNC: 115 MG/DL (ref 70–99)
HCT VFR BLD AUTO: 34.9 % (ref 36–48)
HGB BLD-MCNC: 11.9 G/DL (ref 12–16)
MCH RBC QN AUTO: 31 PG (ref 26–34)
MCHC RBC AUTO-ENTMCNC: 34.1 G/DL (ref 31–36)
MCV RBC AUTO: 90.9 FL (ref 80–100)
PLATELET # BLD AUTO: 258 K/UL (ref 135–450)
PMV BLD AUTO: 9.4 FL (ref 5–10.5)
POTASSIUM SERPL-SCNC: 3.9 MMOL/L (ref 3.5–5.1)
PROT SERPL-MCNC: 5.9 G/DL (ref 6.4–8.2)
RBC # BLD AUTO: 3.84 M/UL (ref 4–5.2)
SODIUM SERPL-SCNC: 139 MMOL/L (ref 136–145)
WBC # BLD AUTO: 7.8 K/UL (ref 4–11)

## 2025-04-25 PROCEDURE — 6370000000 HC RX 637 (ALT 250 FOR IP): Performed by: FAMILY MEDICINE

## 2025-04-25 PROCEDURE — 97165 OT EVAL LOW COMPLEX 30 MIN: CPT

## 2025-04-25 PROCEDURE — 94760 N-INVAS EAR/PLS OXIMETRY 1: CPT

## 2025-04-25 PROCEDURE — 93306 TTE W/DOPPLER COMPLETE: CPT | Performed by: INTERNAL MEDICINE

## 2025-04-25 PROCEDURE — 85027 COMPLETE CBC AUTOMATED: CPT

## 2025-04-25 PROCEDURE — 97116 GAIT TRAINING THERAPY: CPT

## 2025-04-25 PROCEDURE — 6360000002 HC RX W HCPCS: Performed by: FAMILY MEDICINE

## 2025-04-25 PROCEDURE — 97535 SELF CARE MNGMENT TRAINING: CPT

## 2025-04-25 PROCEDURE — 80053 COMPREHEN METABOLIC PANEL: CPT

## 2025-04-25 PROCEDURE — 97161 PT EVAL LOW COMPLEX 20 MIN: CPT

## 2025-04-25 PROCEDURE — 36415 COLL VENOUS BLD VENIPUNCTURE: CPT

## 2025-04-25 PROCEDURE — 2500000003 HC RX 250 WO HCPCS: Performed by: FAMILY MEDICINE

## 2025-04-25 PROCEDURE — 1200000000 HC SEMI PRIVATE

## 2025-04-25 RX ADMIN — ENOXAPARIN SODIUM 40 MG: 100 INJECTION SUBCUTANEOUS at 20:11

## 2025-04-25 RX ADMIN — SODIUM CHLORIDE, PRESERVATIVE FREE 10 ML: 5 INJECTION INTRAVENOUS at 20:17

## 2025-04-25 RX ADMIN — HYDROCHLOROTHIAZIDE 25 MG: 25 TABLET ORAL at 20:10

## 2025-04-25 RX ADMIN — SODIUM CHLORIDE, PRESERVATIVE FREE 5 ML: 5 INJECTION INTRAVENOUS at 08:05

## 2025-04-25 RX ADMIN — ACETAMINOPHEN 650 MG: 325 TABLET ORAL at 21:24

## 2025-04-25 RX ADMIN — PANTOPRAZOLE SODIUM 40 MG: 40 TABLET, DELAYED RELEASE ORAL at 05:58

## 2025-04-25 RX ADMIN — LOSARTAN POTASSIUM 100 MG: 100 TABLET, FILM COATED ORAL at 20:10

## 2025-04-25 RX ADMIN — ATORVASTATIN CALCIUM 20 MG: 20 TABLET, FILM COATED ORAL at 20:10

## 2025-04-25 RX ADMIN — ACETAMINOPHEN 650 MG: 325 TABLET ORAL at 08:03

## 2025-04-25 RX ADMIN — ALPRAZOLAM 0.25 MG: 0.25 TABLET ORAL at 02:58

## 2025-04-25 RX ADMIN — ACETAMINOPHEN 650 MG: 325 TABLET ORAL at 15:04

## 2025-04-25 RX ADMIN — WATER 1000 MG: 1 INJECTION INTRAMUSCULAR; INTRAVENOUS; SUBCUTANEOUS at 11:50

## 2025-04-25 ASSESSMENT — PAIN DESCRIPTION - DESCRIPTORS: DESCRIPTORS: ACHING

## 2025-04-25 ASSESSMENT — PAIN SCALES - GENERAL
PAINLEVEL_OUTOF10: 4
PAINLEVEL_OUTOF10: 0
PAINLEVEL_OUTOF10: 6
PAINLEVEL_OUTOF10: 2

## 2025-04-25 ASSESSMENT — PAIN DESCRIPTION - ORIENTATION
ORIENTATION: MID

## 2025-04-25 ASSESSMENT — PAIN DESCRIPTION - LOCATION
LOCATION: BACK

## 2025-04-25 NOTE — PLAN OF CARE
Problem: Discharge Planning  Goal: Discharge to home or other facility with appropriate resources  4/24/2025 2140 by Lori Caldera RN  Outcome: Progressing  4/24/2025 1655 by Kemi Gonsales RN  Outcome: Progressing     Problem: Pain  Goal: Verbalizes/displays adequate comfort level or baseline comfort level  4/24/2025 2140 by Lori Caldera RN  Outcome: Progressing  4/24/2025 1655 by Kemi Gonsales RN  Outcome: Progressing     Problem: Safety - Adult  Goal: Free from fall injury  4/24/2025 2140 by Lori Caldera RN  Outcome: Progressing  4/24/2025 1655 by Kemi Gonsales RN  Outcome: Progressing     Problem: Cardiovascular - Adult  Goal: Maintains optimal cardiac output and hemodynamic stability  Outcome: Progressing  Goal: Absence of cardiac dysrhythmias or at baseline  Outcome: Progressing     Problem: Musculoskeletal - Adult  Goal: Return mobility to safest level of function  Outcome: Progressing  Goal: Maintain proper alignment of affected body part  Outcome: Progressing

## 2025-04-25 NOTE — CONSULTS
[facility]     Electrophysiology                                     Consult Note    Admission date:  2025    Reason for consultation: Dizziness     HPI/CC: Denae Hernandez is a 79 y.o. female who was admitted on 25 with dizziness. Pertinent PMH including breast cancer (), anxiety and essential hypertension.       She presented to [facility] after falling in her bathroom at home.     Subjective: *** complains of ***    Vitals:  Blood pressure 125/77, pulse 83, temperature 97.9 °F (36.6 °C), temperature source Oral, resp. rate 18, height 1.651 m (5' 5\"), weight 70.4 kg (155 lb 3.3 oz), SpO2 97%.  Temp  Av.9 °F (36.6 °C)  Min: 97.3 °F (36.3 °C)  Max: 98.2 °F (36.8 °C)  Pulse  Av  Min: 78  Max: 96  BP  Min: 122/65  Max: 139/67  SpO2  Av.7 %  Min: 97 %  Max: 98 %    24 hour I/O    Intake/Output Summary (Last 24 hours) at 2025 1704  Last data filed at 2025 1057  Gross per 24 hour   Intake 170 ml   Output --   Net 170 ml     Current Facility-Administered Medications   Medication Dose Route Frequency Provider Last Rate Last Admin    sodium chloride flush 0.9 % injection 5-40 mL  5-40 mL IntraVENous 2 times per day Mohan Olivas MD   5 mL at 25 0805    sodium chloride flush 0.9 % injection 5-40 mL  5-40 mL IntraVENous PRN Mohan Olivas MD        0.9 % sodium chloride infusion   IntraVENous PRN Mohan Olivas MD        potassium chloride (KLOR-CON M) extended release tablet 40 mEq  40 mEq Oral PRN Mohan Olivas MD   40 mEq at 25 1838    Or    potassium bicarb-citric acid (EFFER-K) effervescent tablet 40 mEq  40 mEq Oral PRN Mohan Olivas MD        Or    potassium chloride 10 mEq/100 mL IVPB (Peripheral Line)  10 mEq IntraVENous PRN Mohan Olivas MD        magnesium sulfate 2000 mg in 50 mL IVPB premix  2,000 mg IntraVENous PRN Mohan Olivas MD        enoxaparin (LOVENOX) injection 40 mg  40 mg SubCUTAneous Nightly Mohan Olivas MD

## 2025-04-25 NOTE — PROGRESS NOTES
[facility] - Occupational Therapy   Phone: [phone]    Occupational Therapy  Facility/Department:[address] MED SURG    [x] Initial Evaluation            [] Daily Treatment Note         [x] Discharge Summary      Patient: Denae Hernandez   : 1945   MRN: [id number]   Date of Service:  2025    Staff Mobility Recommendation: RW x1     AM-PAC Score:   Discharge Recommendations: home w/PRN assist     Admitting Diagnosis:  Dizziness  Ordering Physician: Mohan Olivas MD   Current Admission Summary: Per MD Brendon \"Denae Hernandez is a 79 y.o. female with pmh of essential hypertension, osteoporosis who presents with dizziness, lightheadedness.  Patient comes from home, she states that she has been feeling lightheaded and dizzy for the last few months.  Patient was in the bathroom this morning felt like she was get a pass out.  Had some palpitations.  She states that she fell and hit her back. She did not hurt her head.  She adds that she mowed her lawn yesterday feeling well, but has had palpitations later Workup in the ED was unremarkable other than troponin at 20, 33.  Patient was seen by cardiology in the past and was supposed to get a cardiac echo in May and a Holter monitor.\"    Past Medical History:  has a past medical history of Cancer (HCC), Essential hypertension, benign, Fibrocystic disease of breast, Fracture of finger of right hand, Lump or mass in breast, Other and unspecified hyperlipidemia, Ovarian cyst, Peptic ulcer, unspecified site, unspecified as acute or chronic, without mention of hemorrhage, perforation, or obstruction, Personal history of malignant neoplasm of breast, Senile osteoporosis, and Varicose veins of lower extremities.  Past Surgical History:  has a past surgical history that includes Hysterectomy; Mastectomy (); Appendectomy; Tonsillectomy; and Breast biopsy (Left, ).    Assessment  Activity Tolerance: Good  Impairments Requiring Therapeutic

## 2025-04-25 NOTE — PROGRESS NOTES
1-2 days  Patient requires continued admission due to clinical improvement, further evaluation   Surrogate Decision Maker/ POA Self     Personally reviewed Lab Studies and Imaging         Medical Decision Making:  The following items were considered in medical decision making:  Discussion of patient care with other providers  Reviewed clinical lab tests  Reviewed radiology tests  Reviewed other diagnostic tests/interventions  Independent review of radiologic images  Microbiology cultures and other micro tests reviewed      Subjective:     Chief Complaint: Dizziness, palpitation    Denae Hernandez is a 79 y.o. female who presents with dizziness and palpitation      Review of Systems:      Pertinent positives and negatives discussed in HPI    Objective:     Intake/Output Summary (Last 24 hours) at 4/25/2025 1110  Last data filed at 4/25/2025 1057  Gross per 24 hour   Intake 170 ml   Output --   Net 170 ml      Vitals:   Vitals:    04/25/25 0301 04/25/25 0820 04/25/25 0903 04/25/25 1106   BP: 135/73 131/64  122/65   Pulse: 82 88  96   Resp: 16 18  18   Temp: 97.3 °F (36.3 °C) 98.2 °F (36.8 °C)  98.1 °F (36.7 °C)   TempSrc: Oral Oral  Oral   SpO2: 98% 98% 98% 97%   Weight:       Height:             Physical Exam:      Physical Exam Performed:    /65   Pulse 96   Temp 98.1 °F (36.7 °C) (Oral)   Resp 18   Ht 1.651 m (5' 5\")   Wt 70.4 kg (155 lb 3.3 oz)   SpO2 97%   BMI 25.83 kg/m²     General: NAD  Eyes: EOMI  ENT: neck supple  Cardiovascular: Regular rate.  Respiratory: Clear to auscultation  Gastrointestinal: Soft, non tender  Genitourinary: no suprapubic tenderness  Musculoskeletal: No edema  Skin: warm, dry  Neuro: Alert.  Psych: Mood appropriate.       Medications:   Medications:    sodium chloride flush  5-40 mL IntraVENous 2 times per day    enoxaparin  40 mg SubCUTAneous Nightly    cefTRIAXone (ROCEPHIN) IV  1,000 mg IntraVENous Q24H    pantoprazole  40 mg Oral QAM AC    atorvastatin  20 mg Oral  Nightly    losartan  100 mg Oral Nightly    And    hydroCHLOROthiazide  25 mg Oral Nightly      Infusions:    sodium chloride       PRN Meds: sodium chloride flush, 5-40 mL, PRN  sodium chloride, , PRN  potassium chloride, 40 mEq, PRN   Or  potassium alternative oral replacement, 40 mEq, PRN   Or  potassium chloride, 10 mEq, PRN  magnesium sulfate, 2,000 mg, PRN  ondansetron, 4 mg, Q8H PRN   Or  ondansetron, 4 mg, Q6H PRN  polyethylene glycol, 17 g, Daily PRN  acetaminophen, 650 mg, Q6H PRN   Or  acetaminophen, 650 mg, Q6H PRN  ALPRAZolam, 0.25 mg, TID PRN        Labs and Imaging   XR CHEST PORTABLE  Result Date: 4/24/2025  EXAM: 1 VIEW XRAY OF THE CHEST 04/24/2025 09:57:25 AM COMPARISON: 03/13/2026. Stable left lower lobe calcified granuloma. CLINICAL HISTORY: Lightheaded. FINDINGS: LUNGS AND PLEURA: Stable left lower lobe calcified granuloma. Lungs are otherwise clear. No consolidation. No pulmonary edema. No pleural effusion. No pneumothorax. HEART AND MEDIASTINUM: No acute abnormality of the cardiac and mediastinal silhouettes. BONES AND SOFT TISSUES: No acute osseous abnormality. Left axillary surgical clips are noted.     1. No acute findings       CBC:   Recent Labs     04/24/25  0937 04/25/25  0506   WBC 18.8* 7.8   HGB 13.0 11.9*    258     BMP:    Recent Labs     04/24/25  0937 04/25/25  0506    139   K 3.5 3.9    104   CO2 21 25   BUN 17 13   CREATININE 0.7 0.6   GLUCOSE 140* 115*     Hepatic:   Recent Labs     04/24/25  0937 04/25/25  0506   AST 33 21   ALT 33 25   BILITOT 0.7 0.8   ALKPHOS 87 76     Lipids:   Lab Results   Component Value Date/Time    CHOL 152 03/14/2016 06:55 AM    HDL 53 03/14/2016 06:55 AM    TRIG 82 03/14/2016 06:55 AM     Hemoglobin A1C: No results found for: \"LABA1C\"  TSH: No results found for: \"TSH\"  Troponin: No results found for: \"TROPONINT\"  Lactic Acid: No results for input(s): \"LACTA\" in the last 72 hours.  BNP: No results for input(s): \"PROBNP\" in the  last 72 hours.  UA:  Lab Results   Component Value Date/Time    NITRU Negative 04/24/2025 09:37 AM    COLORU Yellow 04/24/2025 09:37 AM    PHUR 6.5 04/24/2025 09:37 AM    PHUR 6.0 03/13/2016 11:13 PM    WBCUA 21 04/24/2025 09:37 AM    RBCUA 3 04/24/2025 09:37 AM    BACTERIA None Seen 04/24/2025 09:37 AM    CLARITYU CLOUDY 04/24/2025 09:37 AM    LEUKOCYTESUR MODERATE 04/24/2025 09:37 AM    UROBILINOGEN 1.0 04/24/2025 09:37 AM    BILIRUBINUR Negative 04/24/2025 09:37 AM    BLOODU Negative 04/24/2025 09:37 AM    GLUCOSEU Negative 04/24/2025 09:37 AM    KETUA TRACE 04/24/2025 09:37 AM     Urine Cultures: No results found for: \"LABURIN\"  Blood Cultures: No results found for: \"BC\"  No results found for: \"BLOODCULT2\"  Organism: No results found for: \"ORG\"      Electronically signed by Mohan Olivas MD on 4/25/2025 at 11:10 AM  Comment: Please note this report has been produced using speech recognition software and may contain errors related to that system including errors in grammar, punctuation, and spelling, as well as words and phrases that may be inappropriate. If there are any questions or concerns, please feel free to contact the dictating provider for clarification.

## 2025-04-25 NOTE — CARE COORDINATION
Case Management Assessment  Initial Evaluation    Date/Time of Evaluation: 4/25/2025 12:20 PM  Assessment Completed by: MARC Dee    If patient is discharged prior to next notation, then this note serves as note for discharge by case management.    Patient Name: Denae Hernandez                   YOB: 1945  Diagnosis: Palpitations [R00.2]  Dizziness [R42]  Elevated troponin [R79.89]  Episodic lightheadedness [R42]  Heat syncope, initial encounter [T67.1XXA]  Fall in home, initial encounter [W19.XXXA, Y92.009]  Contusion of back, unspecified laterality, initial encounter [S20.229A]  Urinary tract infection in female [N39.0]  Syncope and collapse [R55]                   Date / Time: 4/24/2025  9:11 AM    Patient Admission Status: Inpatient   Readmission Risk (Low < 19, Mod (19-27), High > 27): Readmission Risk Score: 7.8    Current PCP: Marcelino Means MD  PCP verified by CM? (P) Yes    Chart Reviewed: Yes      History Provided by: (P) Patient  Patient Orientation: (P) Alert and Oriented, Person, Place    Patient Cognition: (P) Alert    Hospitalization in the last 30 days (Readmission):  No    If yes, Readmission Assessment in  Navigator will be completed.    Advance Directives:      Code Status: Full Code   Patient's Primary Decision Maker is: (P) Legal Next of Kin      Discharge Planning:    Patient lives with: (P) Spouse/Significant Other Type of Home: (P) House  Primary Care Giver: (P) Self  Patient Support Systems include: (P) Family Members   Current Financial resources: (P) None  Current community resources: (P) None  Current services prior to admission: (P) Durable Medical Equipment            Current DME: (P) Walker, Cane            Type of Home Care services:  (P) None    ADLS  Prior functional level: (P) Independent in ADLs/IADLs  Current functional level: (P) Independent in ADLs/IADLs    PT AM-PAC: 22 /24  OT AM-PAC:   /24    Family can provide assistance at DC: (P) Yes  Would  and/or Patient Representative Agree with the Discharge Plan? (P) Yes    Efrain Dumont LMSW, [facility] Work Case Management   Phone: [phone]  Fax: [phone]

## 2025-04-25 NOTE — PROGRESS NOTES
[facility] - Physical Therapy   Phone: [phone]    Physical Therapy  Unit: [facility] MED SURG    [x] Initial Evaluation            [] Daily Treatment Note         [x] Discharge Summary      Patient: Denae Hernandez   : 1945   MRN: [id number]   Date of Service:  2025  Staff Mobility Recommendation: SBA with walker    AM-PAC score: 22/24  Discharge Recommendations: Denae Hernandez scored a 22/24 on the AM-PAC short mobility form.  At this time, no further PT is recommended upon discharge due to patient being at functional baseline.  Recommend patient returns to prior setting with prior services.    Admitting Diagnosis: Dizziness  Ordering Physician: Mohan Olivas MD   Current Admission Summary: Per MD Brendon \"Denae Hernandez is a 79 y.o. female with pmh of essential hypertension, osteoporosis who presents with dizziness, lightheadedness.  Patient comes from home  She states that she has been feeling lightheaded and dizzy for the last few months.  Patient was in the bathroom this morning felt like she was get a pass out.  Had some palpitations.  She states that she fell and hit her back.  She did not hurt her head.  She adds that she mowed her lawn yesterday feeling well, but has had palpitations later Workup in the ED was unremarkable other than troponin at 20, 33.  Patient was seen by cardiology in the past and was supposed to get a cardiac echo in May and a Holter monitor.\"    Past Medical History:  has a past medical history of Cancer (HCC), Essential hypertension, benign, Fibrocystic disease of breast, Fracture of finger of right hand, Lump or mass in breast, Other and unspecified hyperlipidemia, Ovarian cyst, Peptic ulcer, unspecified site, unspecified as acute or chronic, without mention of hemorrhage, perforation, or obstruction, Personal history of malignant neoplasm of breast, Senile osteoporosis, and Varicose veins of lower extremities.  Past Surgical History:  has a past

## 2025-04-25 NOTE — PROGRESS NOTES
Pt. A&O x 4. VSS. Shift assessment done. Falls precautions in placed. Bed alarm on, wheels locked and to lowest position. PRN medication given as ordered. Bedside table and call light within reach.Encouraged to call for any needs tana in ambulation.

## 2025-04-25 NOTE — PROGRESS NOTES
Pt's orthostatic BP obtained per order   Lying  131/66 pulse 89 bpm  Sitting 130/70 pulse 96 bpm  Standing 132/65 pulse 95  bpm

## 2025-04-25 NOTE — PROGRESS NOTES
Spiritual Health History and Assessment/Progress Note  [facility]    Spiritual/Emotional Needs, Rituals, Rites and Sacraments,  , Adjustment to illness,      Name: Denae Hernandez MRN: [id number]    Age: 79 y.o.     Sex: female   Language: English   Hindu: Scientologist   Dizziness     Date: 4/25/2025            Total Time Calculated: 15 min              Spiritual Assessment began in [facility] SUR[facility]        Referral/Consult From: Rounding   Encounter Overview/Reason: Spiritual/Emotional Needs, Rituals, Rites and Sacraments  Service Provided For: Patient    Flori, Belief, Meaning:   Patient is connected with a flori tradition or spiritual practice and has beliefs or practices that help with coping during difficult times  Family/Friends No family/friends present      Importance and Influence:  Patient has no beliefs influential to healthcare decision-making identified during this visit  Family/Friends No family/friends present    Community:  Patient feels well-supported. Support system includes: Children and Extended family  Family/Friends No family/friends present    Assessment and Plan of Care:     Patient Interventions include: Facilitated expression of thoughts and feelings, Explored spiritual coping/struggle/distress, Affirmed coping skills/support systems, and Provided sacramental/Jain ritual  Family/Friends Interventions include: No family/friends present    Patient Plan of Care: Spiritual Care available upon further referral  Family/Friends Plan of Care: No family/friends present    Electronically signed by Chaplain MARTA on 4/25/2025 at 3:19 PM

## 2025-04-26 VITALS
BODY MASS INDEX: 25.6 KG/M2 | SYSTOLIC BLOOD PRESSURE: 124 MMHG | HEIGHT: 65 IN | DIASTOLIC BLOOD PRESSURE: 60 MMHG | HEART RATE: 80 BPM | OXYGEN SATURATION: 96 % | RESPIRATION RATE: 18 BRPM | WEIGHT: 153.66 LBS | TEMPERATURE: 98.1 F

## 2025-04-26 LAB
ALBUMIN SERPL-MCNC: 3.7 G/DL (ref 3.4–5)
ALBUMIN/GLOB SERPL: 1.6 {RATIO} (ref 1.1–2.2)
ALP SERPL-CCNC: 77 U/L (ref 40–129)
ALT SERPL-CCNC: 28 U/L (ref 10–40)
ANION GAP SERPL CALCULATED.3IONS-SCNC: 12 MMOL/L (ref 3–16)
AST SERPL-CCNC: 25 U/L (ref 15–37)
BILIRUB SERPL-MCNC: 0.6 MG/DL (ref 0–1)
BUN SERPL-MCNC: 14 MG/DL (ref 7–20)
CALCIUM SERPL-MCNC: 9.6 MG/DL (ref 8.3–10.6)
CHLORIDE SERPL-SCNC: 103 MMOL/L (ref 99–110)
CO2 SERPL-SCNC: 23 MMOL/L (ref 21–32)
CREAT SERPL-MCNC: 0.6 MG/DL (ref 0.6–1.2)
DEPRECATED RDW RBC AUTO: 13.5 % (ref 12.4–15.4)
GFR SERPLBLD CREATININE-BSD FMLA CKD-EPI: >90 ML/MIN/{1.73_M2}
GLUCOSE SERPL-MCNC: 116 MG/DL (ref 70–99)
HCT VFR BLD AUTO: 34.9 % (ref 36–48)
HGB BLD-MCNC: 12.1 G/DL (ref 12–16)
MCH RBC QN AUTO: 31.4 PG (ref 26–34)
MCHC RBC AUTO-ENTMCNC: 34.5 G/DL (ref 31–36)
MCV RBC AUTO: 90.9 FL (ref 80–100)
PLATELET # BLD AUTO: 237 K/UL (ref 135–450)
PMV BLD AUTO: 9.3 FL (ref 5–10.5)
POTASSIUM SERPL-SCNC: 3.8 MMOL/L (ref 3.5–5.1)
PROT SERPL-MCNC: 6 G/DL (ref 6.4–8.2)
RBC # BLD AUTO: 3.84 M/UL (ref 4–5.2)
SODIUM SERPL-SCNC: 138 MMOL/L (ref 136–145)
WBC # BLD AUTO: 8.8 K/UL (ref 4–11)

## 2025-04-26 PROCEDURE — 36415 COLL VENOUS BLD VENIPUNCTURE: CPT

## 2025-04-26 PROCEDURE — 94760 N-INVAS EAR/PLS OXIMETRY 1: CPT

## 2025-04-26 PROCEDURE — 6360000002 HC RX W HCPCS: Performed by: FAMILY MEDICINE

## 2025-04-26 PROCEDURE — 6370000000 HC RX 637 (ALT 250 FOR IP): Performed by: FAMILY MEDICINE

## 2025-04-26 PROCEDURE — 99223 1ST HOSP IP/OBS HIGH 75: CPT | Performed by: INTERNAL MEDICINE

## 2025-04-26 PROCEDURE — 85027 COMPLETE CBC AUTOMATED: CPT

## 2025-04-26 PROCEDURE — 80053 COMPREHEN METABOLIC PANEL: CPT

## 2025-04-26 PROCEDURE — 6370000000 HC RX 637 (ALT 250 FOR IP): Performed by: INTERNAL MEDICINE

## 2025-04-26 PROCEDURE — 2500000003 HC RX 250 WO HCPCS: Performed by: FAMILY MEDICINE

## 2025-04-26 RX ORDER — LOSARTAN POTASSIUM 100 MG/1
100 TABLET ORAL ONCE
Status: COMPLETED | OUTPATIENT
Start: 2025-04-26 | End: 2025-04-26

## 2025-04-26 RX ORDER — HYDROCHLOROTHIAZIDE 25 MG/1
25 TABLET ORAL ONCE
Status: COMPLETED | OUTPATIENT
Start: 2025-04-26 | End: 2025-04-26

## 2025-04-26 RX ADMIN — ACETAMINOPHEN 650 MG: 325 TABLET ORAL at 09:34

## 2025-04-26 RX ADMIN — PANTOPRAZOLE SODIUM 40 MG: 40 TABLET, DELAYED RELEASE ORAL at 06:24

## 2025-04-26 RX ADMIN — HYDROCHLOROTHIAZIDE 25 MG: 25 TABLET ORAL at 15:56

## 2025-04-26 RX ADMIN — WATER 1000 MG: 1 INJECTION INTRAMUSCULAR; INTRAVENOUS; SUBCUTANEOUS at 11:28

## 2025-04-26 RX ADMIN — SODIUM CHLORIDE, PRESERVATIVE FREE 10 ML: 5 INJECTION INTRAVENOUS at 09:36

## 2025-04-26 RX ADMIN — LOSARTAN POTASSIUM 100 MG: 100 TABLET, FILM COATED ORAL at 15:56

## 2025-04-26 RX ADMIN — ALPRAZOLAM 0.25 MG: 0.25 TABLET ORAL at 04:23

## 2025-04-26 ASSESSMENT — PAIN SCALES - GENERAL
PAINLEVEL_OUTOF10: 2
PAINLEVEL_OUTOF10: 4

## 2025-04-26 ASSESSMENT — PAIN DESCRIPTION - DESCRIPTORS: DESCRIPTORS: ACHING

## 2025-04-26 ASSESSMENT — PAIN DESCRIPTION - PAIN TYPE: TYPE: ACUTE PAIN

## 2025-04-26 ASSESSMENT — PAIN DESCRIPTION - LOCATION: LOCATION: BACK

## 2025-04-26 ASSESSMENT — PAIN DESCRIPTION - FREQUENCY: FREQUENCY: CONTINUOUS

## 2025-04-26 ASSESSMENT — PAIN DESCRIPTION - ORIENTATION: ORIENTATION: MID

## 2025-04-26 NOTE — PLAN OF CARE
Problem: Pain  Goal: Verbalizes/displays adequate comfort level or baseline comfort level  4/26/2025 1115 by Bernarda Dyer RN  Outcome: Progressing     Problem: Safety - Adult  Goal: Free from fall injury  4/26/2025 1115 by Bernarda Dyer RN  Outcome: Progressing     Problem: Cardiovascular - Adult  Goal: Maintains optimal cardiac output and hemodynamic stability  4/26/2025 1115 by Bernarda Dyer RN  Outcome: Progressing     Problem: Cardiovascular - Adult  Goal: Absence of cardiac dysrhythmias or at baseline  4/26/2025 1115 by Bernarda Dyer RN  Outcome: Progressing     Problem: Musculoskeletal - Adult  Goal: Return mobility to safest level of function  4/26/2025 1115 by Bernarda Dyer RN  Outcome: Progressing     Problem: Musculoskeletal - Adult  Goal: Maintain proper alignment of affected body part  4/26/2025 1115 by Bernarda Dyer RN  Outcome: Progressing     Problem: Skin/Tissue Integrity - Adult  Goal: Skin integrity remains intact  4/26/2025 1115 by Bernarda Dyer RN  Outcome: Progressing     Problem: Skin/Tissue Integrity - Adult  Goal: Incisions, wounds, or drain sites healing without S/S of infection  4/26/2025 1115 by Bernarda Dyer RN  Outcome: Progressing     Problem: Skin/Tissue Integrity - Adult  Goal: Oral mucous membranes remain intact  4/26/2025 1115 by Bernarda Dyer RN  Outcome: Progressing     Problem: Discharge Planning  Goal: Discharge to home or other facility with appropriate resources  4/26/2025 1115 by Bernarda Dyer RN  Outcome: Progressing

## 2025-04-26 NOTE — PROGRESS NOTES
V2.0    [facility] [place]      Name:  Denae Hernandez /Age/Sex: 1945  (79 y.o. female)   MRN & CSN:  [id number] & [id number] Encounter Date/Time: 2025 11:10 AM EDT   Location:  [id number] PCP: Marcelino Means MD     Attending:Mohan Olivas MD       Hospital Day: 3    Assessment and Recommendations   Denae Hernandez is a 79 y.o. female with pmh of essential hypertension who presents with Dizziness      Patient was evaluated this morning.  Feeling well and back to baseline.  Patient has not had any dizzy spells since she has been here.    Very eager to be discharged back to home this morning    Patient underwent a cardiac echo which showed EF of 70, mild concentric hypertrophy,     Awaiting cardiology recommendations for possible discharge later today      Labs obtained this morning showing sodium 138, potassium 3.8, creat 0.6, glucose 116, WBC 8.8, H&H stable 12.1, 34.9 will continue to monitor        Plan:  Dizziness with syncope with palpitation resolved  Symptoms ongoing for the last few months.  Feeling lightheaded, dizzy, palpitations  Was following up with cardiology at [facility] recommended an echo and Holter monitor, has an appointment in may  EKG here sinus rhythm heart rate 74 no ischemic finding   cardiac echo    orthostatic vitals  Cardio consult   PT OT to evaluate     UTI  Bacteriuria on presentation, wbc at 18.8  Rocephin 1 g IV daily  Follow urine culture     3.  Anxiety  Xanax 0.5 mg 3 times a day as needed for anxiety     4.  Essential hypertension  Losartan hydrochlorothiazide 100-25 mg daily     5.  GERD  Protonix 40 mg daily     Will continue to follow, monitor and manage chronic medical conditions with medication listed below    Diet ADULT DIET; Regular   DVT Prophylaxis [x] Lovenox, []  Heparin, [] SCDs, [x] Ambulation,  [] Eliquis, [] Xarelto  [] Coumadin   Code Status Full Code   Disposition From: Home  Expected Disposition: Home  Estimated Date of Discharge:

## 2025-04-26 NOTE — PLAN OF CARE
Problem: Discharge Planning  Goal: Discharge to home or other facility with appropriate resources  Outcome: Progressing     Problem: Pain  Goal: Verbalizes/displays adequate comfort level or baseline comfort level  Outcome: Progressing     Problem: Safety - Adult  Goal: Free from fall injury  Outcome: Progressing     Problem: Cardiovascular - Adult  Goal: Maintains optimal cardiac output and hemodynamic stability  Outcome: Progressing  Goal: Absence of cardiac dysrhythmias or at baseline  Outcome: Progressing     Problem: Musculoskeletal - Adult  Goal: Return mobility to safest level of function  Outcome: Progressing  Goal: Maintain proper alignment of affected body part  Outcome: Progressing     Problem: Skin/Tissue Integrity - Adult  Goal: Skin integrity remains intact  Outcome: Progressing  Goal: Incisions, wounds, or drain sites healing without S/S of infection  Outcome: Progressing  Goal: Oral mucous membranes remain intact  Outcome: Progressing

## 2025-04-26 NOTE — CONSULTS
[facility]  Cardiology Consult Note        CC:      Mildly elevated Troponin, syncope             HPI:   This is a 79 y.o. female who we are asked to see for mildly elevated troponin/syncope.    The patient had got up to go to the bathroom.  She states that she was a little lightheaded and dizzy prior to that.  When she got up from the bathroom she felt even more lightheaded and dizzy.  She tried to grab the door and missed it and fell very hard on the ground and hurt herself.  She did not lose consciousness.  This is the only episode of dizziness that she has had.    She also mentions palpitations which have a little bit more frequent.  This happens maybe 3-4 times in the last 4 months.  They are random.  It occurred yesterday evening and she went to bed with the palpitations.  She is also noticed palpitations other times when she was cutting her grass.  And she has no chest pain with exertion.  The dizziness does not happen when she has palpitations.      Past Medical History:   Diagnosis Date    Cancer (HCC)     left breast cancer s/p mastectomy    Essential hypertension, benign     Fibrocystic disease of breast     Fracture of finger of right hand     5th finger , fx after injury , numerous years ago     Lump or mass in breast     Other and unspecified hyperlipidemia     Ovarian cyst     Peptic ulcer, unspecified site, unspecified as acute or chronic, without mention of hemorrhage, perforation, or obstruction     Personal history of malignant neoplasm of breast     Senile osteoporosis 2018    Varicose veins of lower extremities       Past Surgical History:   Procedure Laterality Date    APPENDECTOMY      BREAST BIOPSY Left     hx of mastectomy     HYSTERECTOMY (CERVIX STATUS UNKNOWN)      MASTECTOMY  2001    left    TONSILLECTOMY        Family History   Problem Relation Age of Onset    Cancer Father             Hypertension Mother         in stable health    Coronary Art Dis Mother   dry,  No rash seen        Labs:   Recent Labs     04/25/25  0506 04/26/25  0456   WBC 7.8 8.8   HGB 11.9* 12.1   HCT 34.9* 34.9*    237     Recent Labs     04/25/25  0506 04/26/25  0456    138   K 3.9 3.8   CO2 25 23   BUN 13 14   CREATININE 0.6 0.6   GLUCOSE 115* 116*     No results for input(s): \"BNP\" in the last 72 hours.        EKG: Personally reviewed.  4/24/2025  NSR     ECHO: 4/24/2024    Left Ventricle: Normal left ventricular systolic function. EF by visual approximation is 70%. Left ventricle size is normal. Findings consistent with mild concentric hypertrophy. Normal wall motion. Normal diastolic function.    Right Ventricle: Right ventricle is smaller than normal. Reduced systolic function. TAPSE is abnormal. TAPSE is 1.2 cm. RV Peak S' is 15.5 cm/s.    Aortic Valve: Mild regurgitation.    Mitral Valve: Mildly thickened leaflets. Trace regurgitation.    Tricuspid Valve: Trace regurgitation.    Left Atrium: Left atrium size is normal. Left atrial volume index is normal (16-34 mL/m2) mL/m2.    Right Atrium: Right atrium is smaller than normal.    Image quality is adequate.        ASSESSMENT AND PLAN:      Elevated troponins  I believe the symptoms are likely related to her recent significant fall and resulting skeletal muscle trauma.  She is currently asymptomatic, with no chest pain and no ischemic ST changes on evaluation.  Additionally, she reports no chest pain during activities such as mowing the lawn.  At this time, I do not believe further workup is necessary.    Palpitations  Random  She has some kind of an arrhythmia  A-fib needs to be ruled out (She does have LVH and hypertension history)  Would recommend a 30-day event monitor.  She has a primary cardiologist, Dr. Alvarez at [facility].  I have asked her to follow-up with her, and have a 30-day event monitor placed for diagnosis.    Dizziness:  The episode occurred as an isolated event last night.  I am not certain if it is related to

## 2025-04-26 NOTE — PROGRESS NOTES
Verbal and written discharge instructions given to patient and spouse. Patient denies questions. Patient discharged via wheel chair with belongings in hand. No concerns, belongings with patient.

## 2025-04-26 NOTE — DISCHARGE INSTR - DIET
Good nutrition is important when healing from an illness, injury, or surgery.  Follow any nutrition recommendations given to you during your hospital stay.   If you were given an oral nutrition supplement while in the hospital, continue to take this supplement at home.  You can take it with meals, in-between meals, and/or before bedtime. These supplements can be purchased at most local grocery stores, pharmacies, and chain [employer]-stores.   If you have any questions about your diet or nutrition, call the hospital and ask for the dietitian.  Regular diet

## 2025-04-28 NOTE — DISCHARGE SUMMARY
Hospital Medicine Discharge Summary    Patient ID: Denae Hernandez      Patient's PCP: Marcelino Means MD    Admit Date: 4/24/2025     Discharge Date: 4/26/2025      Admitting Provider: Mohan Olivas MD     Discharge Provider: Mohan Olivas MD     Discharge Diagnoses:       Active Hospital Problems    Diagnosis     Syncope and collapse [R55]     Dizziness [R42]        The patient was seen and examined on day of discharge and this discharge summary is in conjunction with any daily progress note from day of discharge.    Hospital Course:   Denae Hernandez is a 79 y.o. female with pmh of essential hypertension who presents with Dizziness   Patient was being worked up with cardiology at [facility] and recommended an echo and Holter monitor.  Cardiology here was consulted she underwent a cardiac echo which showed ejection fraction of 70%, mild concentric hypertrophy.  Patient was very eager to be discharged back to home and with follow-up with her cardiologist at [facility] for further recommendations.  Patient has been symptom-free since her presentation, has not had any dizzy spells a and feeling great at her baseline  Patient would like to be discharged back to home.  Patient was thought to have a urinary tract infection with bacteriuria on presentation, elevated WBC.  Labs improved with IV hydration, urine culture showed no growth    Patient is to follow-up with PCP in 3 to 5 days or symptom  Patient is followed with cardiology at [facility]  Patient is to return to the hospital symptoms return    Plan:  Dizziness with syncope with palpitation resolved  Symptoms ongoing for the last few months.  Feeling lightheaded, dizzy, palpitations  Was following up with cardiology at [facility] recommended an echo and Holter monitor, has an appointment in may  EKG here sinus rhythm heart rate 74 no ischemic finding   cardiac echo    orthostatic vitals  Cardio consult   PT OT to evaluate     UTI ruled  tablet Take 1 tablet by mouth at bedtimeHistorical Med      acetaminophen (TYLENOL) 325 MG tablet Take 2 tablets by mouth every 6 hours as needed for PainHistorical Med      Wheat Dextrin (BENEFIBER) POWD Take 4 g by mouth daily      pantoprazole (PROTONIX) 40 MG tablet Take 1 tablet by mouth daily (before lunch), R-5Historical Med      losartan-hydrochlorothiazide (HYZAAR) 100-25 MG per tablet Take 1 tablet by mouth nightlyHistorical Med      alprazolam (XANAX) 0.25 MG tablet Take 1 tablet by mouth 3 times daily as needed for Anxiety.Historical Med      Multiple Vitamins-Minerals (CENTRUM SILVER ULTRA WOMENS PO) Take 1 tablet by mouth dailyHistorical Med      denosumab (PROLIA) 60 MG/ML SOLN SC injection Inject 1 mL into the skin every 6 monthsHistorical Med             Time Spent on discharge: 35 in the examination, evaluation, counseling and review of medications and discharge plan.      Signed:    Mohan Olivas MD   4/28/2025      Thank you Marcelino Means MD for the opportunity to be involved in this patient's care. If you have any questions or concerns, please feel free to contact me at [phone].    Comment: Please note this report has been produced using speech recognition software and may contain errors related to that system including errors in grammar, punctuation, and spelling, as well as words and phrases that may be inappropriate. If there are any questions or concerns, please feel free to contact the dictating provider for clarification.

## 2025-06-06 ENCOUNTER — HOSPITAL ENCOUNTER (EMERGENCY)
Age: 80
Discharge: HOME | End: 2025-06-06
Attending: EMERGENCY MEDICINE
Payer: MEDICARE

## 2025-06-06 ENCOUNTER — APPOINTMENT (OUTPATIENT)
Dept: CT IMAGING | Age: 80
End: 2025-06-06
Payer: MEDICARE

## 2025-06-06 VITALS
DIASTOLIC BLOOD PRESSURE: 89 MMHG | HEART RATE: 90 BPM | WEIGHT: 158.07 LBS | RESPIRATION RATE: 14 BRPM | SYSTOLIC BLOOD PRESSURE: 147 MMHG | OXYGEN SATURATION: 96 % | HEIGHT: 65 IN | TEMPERATURE: 98.6 F | BODY MASS INDEX: 26.34 KG/M2

## 2025-06-06 DIAGNOSIS — N30.01 ACUTE CYSTITIS WITH HEMATURIA: ICD-10-CM

## 2025-06-06 DIAGNOSIS — N30.01 ACUTE HEMORRHAGIC CYSTITIS: Primary | ICD-10-CM

## 2025-06-06 LAB
ALBUMIN SERPL-MCNC: 4.2 G/DL (ref 3.4–5)
ALBUMIN/GLOB SERPL: 1.8 {RATIO} (ref 1.1–2.2)
ALP SERPL-CCNC: 77 U/L (ref 40–129)
ALT SERPL-CCNC: 23 U/L (ref 10–40)
ANION GAP SERPL CALCULATED.3IONS-SCNC: 12 MMOL/L (ref 3–16)
AST SERPL-CCNC: 28 U/L (ref 15–37)
BACTERIA URNS QL MICRO: ABNORMAL /HPF
BASOPHILS # BLD: 0.1 K/UL (ref 0–0.2)
BASOPHILS NFR BLD: 0.4 %
BILIRUB SERPL-MCNC: 0.5 MG/DL (ref 0–1)
BILIRUB UR QL STRIP.AUTO: ABNORMAL
BUN SERPL-MCNC: 14 MG/DL (ref 7–20)
CALCIUM SERPL-MCNC: 9.9 MG/DL (ref 8.3–10.6)
CHLORIDE SERPL-SCNC: 102 MMOL/L (ref 99–110)
CLARITY UR: ABNORMAL
CO2 SERPL-SCNC: 24 MMOL/L (ref 21–32)
COLOR UR: ABNORMAL
CREAT SERPL-MCNC: 0.7 MG/DL (ref 0.6–1.2)
DEPRECATED RDW RBC AUTO: 13.5 % (ref 12.4–15.4)
EOSINOPHIL # BLD: 0.1 K/UL (ref 0–0.6)
EOSINOPHIL NFR BLD: 0.4 %
GFR SERPLBLD CREATININE-BSD FMLA CKD-EPI: 88 ML/MIN/{1.73_M2}
GLUCOSE SERPL-MCNC: 138 MG/DL (ref 70–99)
GLUCOSE UR STRIP.AUTO-MCNC: NEGATIVE MG/DL
HCT VFR BLD AUTO: 34.2 % (ref 36–48)
HGB BLD-MCNC: 11.5 G/DL (ref 12–16)
HGB UR QL STRIP.AUTO: ABNORMAL
KETONES UR STRIP.AUTO-MCNC: NEGATIVE MG/DL
LEUKOCYTE ESTERASE UR QL STRIP.AUTO: ABNORMAL
LYMPHOCYTES # BLD: 0.9 K/UL (ref 1–5.1)
LYMPHOCYTES NFR BLD: 6.1 %
MCH RBC QN AUTO: 30.8 PG (ref 26–34)
MCHC RBC AUTO-ENTMCNC: 33.5 G/DL (ref 31–36)
MCV RBC AUTO: 91.9 FL (ref 80–100)
MONOCYTES # BLD: 1 K/UL (ref 0–1.3)
MONOCYTES NFR BLD: 7.2 %
NEUTROPHILS # BLD: 12.1 K/UL (ref 1.7–7.7)
NEUTROPHILS NFR BLD: 85.9 %
NITRITE UR QL STRIP.AUTO: NEGATIVE
PH UR STRIP.AUTO: 6 [PH] (ref 5–8)
PLATELET # BLD AUTO: 260 K/UL (ref 135–450)
PMV BLD AUTO: 9.4 FL (ref 5–10.5)
POTASSIUM SERPL-SCNC: 3.6 MMOL/L (ref 3.5–5.1)
PROT SERPL-MCNC: 6.6 G/DL (ref 6.4–8.2)
PROT UR STRIP.AUTO-MCNC: >=300 MG/DL
RBC # BLD AUTO: 3.73 M/UL (ref 4–5.2)
RBC #/AREA URNS HPF: >100 /HPF (ref 0–4)
REASON FOR REJECTION: NORMAL
REJECTED TEST: NORMAL
SODIUM SERPL-SCNC: 138 MMOL/L (ref 136–145)
SP GR UR STRIP.AUTO: 1.02 (ref 1–1.03)
UA COMPLETE W REFLEX CULTURE PNL UR: ABNORMAL
UA DIPSTICK W REFLEX MICRO PNL UR: YES
URN SPEC COLLECT METH UR: ABNORMAL
UROBILINOGEN UR STRIP-ACNC: 1 E.U./DL
WBC # BLD AUTO: 14.1 K/UL (ref 4–11)
WBC #/AREA URNS HPF: ABNORMAL /HPF (ref 0–5)

## 2025-06-06 PROCEDURE — 74176 CT ABD & PELVIS W/O CONTRAST: CPT

## 2025-06-06 PROCEDURE — 2500000003 HC RX 250 WO HCPCS: Performed by: EMERGENCY MEDICINE

## 2025-06-06 PROCEDURE — 80053 COMPREHEN METABOLIC PANEL: CPT

## 2025-06-06 PROCEDURE — 6360000002 HC RX W HCPCS: Performed by: EMERGENCY MEDICINE

## 2025-06-06 PROCEDURE — 99284 EMERGENCY DEPT VISIT MOD MDM: CPT

## 2025-06-06 PROCEDURE — 85025 COMPLETE CBC W/AUTO DIFF WBC: CPT

## 2025-06-06 PROCEDURE — 96374 THER/PROPH/DIAG INJ IV PUSH: CPT

## 2025-06-06 PROCEDURE — 81001 URINALYSIS AUTO W/SCOPE: CPT

## 2025-06-06 RX ORDER — CEFUROXIME AXETIL 250 MG/1
250 TABLET ORAL 2 TIMES DAILY
Qty: 14 TABLET | Refills: 0 | Status: SHIPPED | OUTPATIENT
Start: 2025-06-06 | End: 2025-06-13

## 2025-06-06 RX ADMIN — WATER 1000 MG: 1 INJECTION INTRAMUSCULAR; INTRAVENOUS; SUBCUTANEOUS at 03:27

## 2025-06-06 NOTE — DISCHARGE INSTRUCTIONS
Drink plenty of fluids.    Watch for fever, shaking chills, vomiting, increased bleeding, passing large clots, or inability to urinate.    Follow-up with the urologist next week if no improvement.  Further testing may be needed.    Follow-up with a primary care physician in 1 to 2 days for reexamination.  Call today for an appointment.  If condition worsens or new symptoms develop, return immediately to the emergency department.

## 2025-06-06 NOTE — ED PROVIDER NOTES
[facility] EMERGENCY DEPARTMENT  EMERGENCY DEPARTMENT ENCOUNTER      Pt Name: Denae Hernandez  MRN: [id number]  Birthdate 1945  Date of evaluation: 6/6/2025  Provider: KOSTAS NICOLAS DO    CHIEF COMPLAINT       Chief Complaint   Patient presents with    Hematuria    Urinary Frequency         HISTORY OF PRESENT ILLNESS   (Location/Symptom, Timing/Onset, Context/Setting, Quality, Duration, Modifying Factors, Severity)  Note limiting factors.   Dneae Hernandez is a 79 y.o. female who presents to the emergency department with a complaint of bloody urine.  The patient states that yesterday she noticed that her urine smelled strong like she may be getting an infection.  She denies any vaginal bleeding or discharge.  Earlier this evening she noticed a slow pink tinge to the urine when she wiped after urinating.  She reports that she has had urinary urgency and frequency over the last few hours and the last time she urinated the urine was red with few small tiny clots on the toilet paper after wiping.  No previous occurrence.    She states that she has been on Eliquis now for 2 months for atrial fibrillation.  She denies any melena or hematochezia.  No recent fall trauma or injury.    She denies any dizziness or lightheadedness.  No syncope or palpitations.  No chest pain or shortness of breath.    No prior history of hematuria.  She denies any history of kidney stones.  She denies any back or flank pain.    Medical history is significant for hypertension, peptic ulcer disease,    Nursing Notes were reviewed.    HPI        REVIEW OF SYSTEMS    (2-9 systems for level 4, 10 or more for level 5)       Constitutional: Negative for fever or chills.     HENT: Negative for rhinorrhea and sore throat.    Eyes: Negative for redness or drainage.     Respiratory: Negative for shortness of breath or dyspnea on exertion.     Cardiovascular: Negative for chest pain.   Gastrointestinal: Negative for abdominal  stayed: outside, in a car, in a tent, in an overnight shelter, or temporarily in someone else's home (i.e. couch-surfing)?: No     Within the past 12 months, have you been unable to get utilities (heat, electricity) when it was really needed?: No       SCREENINGS    Randall Coma Scale  Eye Opening: Spontaneous  Best Verbal Response: Oriented  Best Motor Response: Obeys commands  Randall Coma Scale Score: 15        PHYSICAL EXAM    (up to 7 for level 4, 8 or more for level 5)     ED Triage Vitals [06/06/25 0231]   BP Systolic BP Percentile Diastolic BP Percentile Temp Temp Source Pulse Respirations SpO2   (!) 147/89 -- -- 98.6 °F (37 °C) Oral 90 14 96 %      Height Weight - Scale         1.651 m (5' 5\") 71.7 kg (158 lb 1.1 oz)               Physical Exam   Constitutional: Awake and alert.    Head: No visible evidence of trauma.  Normocephalic.  Eyes: Pupils equal and reactive.  No photophobia.  Conjunctiva normal.    HENT: Oral mucosa moist.  Airway patent.  Pharynx without erythema.  Nares were clear.  Neck:  Soft and supple.   Nontender.  Heart:  Regular rate and rhythm.    Lungs:  Clear to auscultation.  No wheezes, rales, or ronchi. No conversational dyspnea or accessory muscle use.  Chest: Chest wall non-tender.  No evidence of trauma.  Abdomen:  Soft, nondistended, bowel sounds present.  Mild pressure in the suprapubic area.  Nontender.  No CVA tenderness.  No guarding rigidity or rebound. No masses.   Musculoskeletal: Extremities non-tender with full range of motion.  Radial and dorsalis pedis pulses were intact.  No calf tenderness erythema or edema.  Neurological: Alert and oriented x 3.  Speech clear. No facial droop.  No acute focal motor or sensory deficits.  Skin: Skin is warm and dry. No rash.   Lymphatic:  No lympadenopathy.    Psychiatric: Normal mood and affect. Behavior is normal.         DIAGNOSTIC RESULTS     EKG: All EKG's are interpreted by me, the Emergency Department Physician, who either

## 2025-06-06 NOTE — ED TRIAGE NOTES
Patient to the ER with complaints of urinary frequency and burning with urination that started around 1700 this evening.  Around 2200, she started with blood in her urine.  She says she has started seeing small blood clots.     Patient is on eliquis.     Alert and oriented x4 and ambulatory with a cane at time of triage.